# Patient Record
Sex: MALE | Race: WHITE | NOT HISPANIC OR LATINO
[De-identification: names, ages, dates, MRNs, and addresses within clinical notes are randomized per-mention and may not be internally consistent; named-entity substitution may affect disease eponyms.]

---

## 2017-03-10 ENCOUNTER — APPOINTMENT (OUTPATIENT)
Dept: PULMONOLOGY | Facility: CLINIC | Age: 72
End: 2017-03-10

## 2017-03-10 VITALS
OXYGEN SATURATION: 97 % | HEART RATE: 62 BPM | DIASTOLIC BLOOD PRESSURE: 80 MMHG | SYSTOLIC BLOOD PRESSURE: 110 MMHG | RESPIRATION RATE: 17 BRPM | HEIGHT: 70 IN | WEIGHT: 190 LBS | BODY MASS INDEX: 27.2 KG/M2

## 2017-07-14 ENCOUNTER — APPOINTMENT (OUTPATIENT)
Dept: PULMONOLOGY | Facility: CLINIC | Age: 72
End: 2017-07-14

## 2017-07-14 VITALS
OXYGEN SATURATION: 97 % | RESPIRATION RATE: 16 BRPM | BODY MASS INDEX: 27.2 KG/M2 | WEIGHT: 190 LBS | HEIGHT: 70 IN | DIASTOLIC BLOOD PRESSURE: 79 MMHG | HEART RATE: 61 BPM | SYSTOLIC BLOOD PRESSURE: 127 MMHG

## 2017-07-14 RX ORDER — AMOXICILLIN 500 MG/1
500 CAPSULE ORAL
Qty: 14 | Refills: 0 | Status: DISCONTINUED | COMMUNITY
Start: 2017-01-26 | End: 2017-07-14

## 2017-07-14 RX ORDER — CLINDAMYCIN PHOSPHATE 10 MG/ML
1 LOTION TOPICAL
Qty: 60 | Refills: 0 | Status: ACTIVE | COMMUNITY
Start: 2016-12-20

## 2017-07-14 RX ORDER — DIAZEPAM 5 MG/1
5 TABLET ORAL
Qty: 2 | Refills: 0 | Status: ACTIVE | COMMUNITY
Start: 2016-09-30

## 2017-07-14 RX ORDER — MINOCYCLINE HYDROCHLORIDE 100 MG/1
100 CAPSULE ORAL
Qty: 14 | Refills: 0 | Status: DISCONTINUED | COMMUNITY
Start: 2016-12-20 | End: 2017-07-14

## 2017-08-30 ENCOUNTER — RX RENEWAL (OUTPATIENT)
Age: 72
End: 2017-08-30

## 2017-11-14 ENCOUNTER — APPOINTMENT (OUTPATIENT)
Dept: PULMONOLOGY | Facility: CLINIC | Age: 72
End: 2017-11-14

## 2018-01-29 ENCOUNTER — MEDICATION RENEWAL (OUTPATIENT)
Age: 73
End: 2018-01-29

## 2018-01-29 DIAGNOSIS — Z29.9 ENCOUNTER FOR PROPHYLACTIC MEASURES, UNSPECIFIED: ICD-10-CM

## 2018-03-27 ENCOUNTER — APPOINTMENT (OUTPATIENT)
Dept: PULMONOLOGY | Facility: CLINIC | Age: 73
End: 2018-03-27
Payer: MEDICARE

## 2018-03-27 ENCOUNTER — MEDICATION RENEWAL (OUTPATIENT)
Age: 73
End: 2018-03-27

## 2018-03-27 VITALS
DIASTOLIC BLOOD PRESSURE: 70 MMHG | HEIGHT: 70 IN | SYSTOLIC BLOOD PRESSURE: 110 MMHG | HEART RATE: 73 BPM | WEIGHT: 187 LBS | BODY MASS INDEX: 26.77 KG/M2 | RESPIRATION RATE: 17 BRPM | OXYGEN SATURATION: 96 %

## 2018-03-27 PROCEDURE — 94618 PULMONARY STRESS TESTING: CPT

## 2018-03-27 PROCEDURE — 99214 OFFICE O/P EST MOD 30 MIN: CPT | Mod: 25

## 2018-03-27 PROCEDURE — 94010 BREATHING CAPACITY TEST: CPT | Mod: 59

## 2018-03-27 RX ORDER — OSELTAMIVIR PHOSPHATE 75 MG/1
75 CAPSULE ORAL
Qty: 1 | Refills: 0 | Status: DISCONTINUED | COMMUNITY
Start: 2018-01-29 | End: 2018-03-27

## 2018-07-27 ENCOUNTER — APPOINTMENT (OUTPATIENT)
Dept: PULMONOLOGY | Facility: CLINIC | Age: 73
End: 2018-07-27

## 2018-09-18 ENCOUNTER — NON-APPOINTMENT (OUTPATIENT)
Age: 73
End: 2018-09-18

## 2018-09-18 ENCOUNTER — APPOINTMENT (OUTPATIENT)
Dept: PULMONOLOGY | Facility: CLINIC | Age: 73
End: 2018-09-18
Payer: MEDICARE

## 2018-09-18 VITALS
RESPIRATION RATE: 17 BRPM | WEIGHT: 189 LBS | HEART RATE: 75 BPM | OXYGEN SATURATION: 95 % | DIASTOLIC BLOOD PRESSURE: 68 MMHG | HEIGHT: 69 IN | BODY MASS INDEX: 27.99 KG/M2 | SYSTOLIC BLOOD PRESSURE: 116 MMHG

## 2018-09-18 PROCEDURE — 94010 BREATHING CAPACITY TEST: CPT

## 2018-09-18 PROCEDURE — 99214 OFFICE O/P EST MOD 30 MIN: CPT | Mod: 25

## 2018-09-18 NOTE — REASON FOR VISIT
[Follow-Up] : a follow-up visit [FreeTextEntry1] : abnormal chest CT, allergic rhinitis, COPD, malignant lung neoplasm, overweight, TERESA and SOB

## 2018-09-18 NOTE — ASSESSMENT
[FreeTextEntry1] : Mr. Mittal is stable from COPD, lung cancer multiple times (surgeries/ radioablation), and asthma perspective, except worsened Ct of the chest with enlarged and solitary nodule. \par \par problem 1: COPD/asthma \par -continue to use Advair 115 1 inhalation BID\par -continue to use Ventolin PRN and before exercise\par \par -COPD is a progressive disease and although it can’t be cured , appropriate management can slow its progression, reduce frequency and severity of exacerbations, and improve symptoms and the patient quality of life. Hospitalizations are the greatest contributor to the total COPD costs and account for up to 87% of total COPD related costs. Exacerbations are the main cause of admissions and subsequently account for the 40-75% of COPD costs. Inhaled maintenance therapy reduces the incidence of exacerbations in patients with stable COPD. Incorrect inhaler use and nonadherence are major obstacles to achieving COPD treatment goals. Many COPD patients have challenges (impaired inhalation, limited dexterity, reduced cognition: that limit their ability to correctly use their COPD treatment devices resulting in reduced symptom control. Of most importance is smoking cessation and early intervention with respiratory illnesses and contemplation for pulmonary rehab to enhance quality of life. \par -Asthma is believed to be caused by inherited (genetic) and environmental factor, but its exact cause is unknown. Asthma may be triggered by allergens, lung infections, or irritants in the air. Asthma triggers are different for each person\par -Inhaler technique reviewed as well as oral hygiene techniques reviewed with patient. Avoidance of cold air, extremes of temperature, rescue inhaler should be used before exercise. Order of medication reviewed with patient. Recommended use of a cool mist humidifier in the bedroom.\par \par problem 2: allergic rhinitis\par -continue to use Astelin 0.15 1 sniff each nostril BID\par -continue to use Cetirizine 10 mg before bed\par -continue to use Claritin 10 mg QHS\par \par -Environmental measures for allergies were encouraged including mattress and pillow cover, air purifier, and environmental controls. \par \par problem 3: TERESA\par -recommended to use Oxy-Aid by Respitec or Nozovent \par -recommended Provent nasal strip \par -Discussed the risks/associations with coronary artery disease, atrial fibrillation, arrhythmia, memory loss, issues with concentration, stroke risk, hypertension, nocturia, chronic reflux/Guerra’s esophagus some but not all inclusive. Treatment options discussed including CPAP/BiPAP machine, oral appliance, ProVent therapy, Oxy-Aid by Respitec, new technologies, or positional sleep.\par \par problem 4: lung cancer\par -follow up chest CT 12/2018 at [facility] [place] c/w cancer\par -I have recommended the patient to have a PET/CT at this point in time. Even if the PET/CT is negative, I believe the patient should continue to have a biopsy of the area. I reviewed with the patient (and his wife who was conference called in) the rationale, logistics, benefits, and risks of this benefits. I have recommended the patient to continue to follow up with [facility] for further treatment following biopsy results.\par -I have reviewed additional treatments including chemotherapy, radioablation surgery, surgical interventions, and other screenings. All questions answered. \par -I spoke to the patient and his wife at length regarding this new abnormality and the possible treatment options. \par \par problem 5: obesity\par -Weight loss, exercise, and diet control were discussed and are highly encouraged. Treatment options were given such as, aqua therapy, and contacting a nutritionist. Recommended to use the elliptical, stationary bike, less use of treadmill. Obesity is associated with worsening asthma, shortness of breath, and potential for cardiac disease, diabetes, and other underlying medical conditions.\par \par problem 6: health maintenance \par -recommended yearly flu shot after October 15\par -recommended strep pneumonia vaccines: Prevnar-13 vaccine, followed by Pneumo vaccine 23 one year following\par -recommended early intervention for URIs\par -recommended regular osteoporosis evaluations\par -recommended early dermatological evaluations\par -recommended after the age of 50 to the age of 70, colonoscopy every 5 years \par \par \par F/U in 3 months\par He is encouraged to call with any changes, concerns, or questions. \par

## 2018-09-18 NOTE — PROCEDURE
[FreeTextEntry1] : PFT- spi reveals mild obstructive dysfunction FEV1 was 2.32 L which is 78% of predicted; normal flow volume loop \par \par He had a CT chest scan performed on 8/27/18, which showed new trace right pleural effusion; unchanged small pericardio effusion new consolidation and infiltrate in the right lung (in the right lower lobe) c/w prior radiation; new subcm nodule in the right upper lobe near the suture line 5 mm

## 2018-09-18 NOTE — ADDENDUM
[FreeTextEntry1] : Documented by Vishnu Ribera acting as a scribe for Dr. Tomas Goodson on 9/18/18\par \par All medical record entries made by the Scribe were at my, Dr. Tomas Goodson's, direction and personally dictated by me on 9/18/18. I have reviewed the chart and agree that the record accurately reflects my personal performance of the history, physical exam, assessment and plan. I have also personally directed, reviewed, and agree with the discharge instructions. \par \par \par \par \par  \par \par

## 2018-09-18 NOTE — PHYSICAL EXAM

## 2018-09-18 NOTE — HISTORY OF PRESENT ILLNESS
[FreeTextEntry1] : Mr. Mittal is a 73 year old male presenting to the office today for a follow up visit with a history of abnormal chest CT, allergic rhinitis, COPD, malignant lung neoplasm, overweight, TERESA and SOB. His chief complaint is his abnormal CT scan. \par - He had a CT scan 3 weeks ago at [facility] in [facility],where another suspicious spot appeared on the left lung that was 5 mm in size. He denies being sick in any way prior to the scan. He is to be observed for the next 3 months at which time he was told to have a follow up CT scan of the chest. \par - He states that he feels in other\par - His sleep is interrupted as he wakes up 4-5 times per night\par - His senses of smell, taste, and vision are fine.\par - his bowels are regular.\par - his weight is stable. he states that he has lost weight as he has been taking supplements. \par - He denies any headaches, nausea, vomiting, fever, chills, sweats, chest pain, chest pressure, palpitations, SOB, coughing, wheezing, fatigue, diarrhea, constipation, dysphagia, myalgias, dizziness, leg swelling, leg pain, itchy eyes, itchy ears, heartburn, reflux, or sour taste in the mouth. \par \par - He called his wife during the office in order for her to have her questions answered regarding the use of medical marijuana to address his new nodule. She also asked for a professional opinion regarding his new cleanse diet.

## 2018-11-05 ENCOUNTER — MEDICATION RENEWAL (OUTPATIENT)
Age: 73
End: 2018-11-05

## 2019-03-08 ENCOUNTER — APPOINTMENT (OUTPATIENT)
Dept: PULMONOLOGY | Facility: CLINIC | Age: 74
End: 2019-03-08

## 2019-08-30 ENCOUNTER — MEDICATION RENEWAL (OUTPATIENT)
Age: 74
End: 2019-08-30

## 2019-10-17 ENCOUNTER — APPOINTMENT (OUTPATIENT)
Dept: PULMONOLOGY | Facility: CLINIC | Age: 74
End: 2019-10-17

## 2019-12-11 ENCOUNTER — RX RENEWAL (OUTPATIENT)
Age: 74
End: 2019-12-11

## 2019-12-17 ENCOUNTER — APPOINTMENT (OUTPATIENT)
Dept: PULMONOLOGY | Facility: CLINIC | Age: 74
End: 2019-12-17
Payer: MEDICARE

## 2019-12-17 VITALS
HEIGHT: 69 IN | HEART RATE: 81 BPM | SYSTOLIC BLOOD PRESSURE: 120 MMHG | RESPIRATION RATE: 17 BRPM | OXYGEN SATURATION: 92 % | DIASTOLIC BLOOD PRESSURE: 70 MMHG | BODY MASS INDEX: 27.99 KG/M2 | WEIGHT: 189 LBS

## 2019-12-17 DIAGNOSIS — Z23 ENCOUNTER FOR IMMUNIZATION: ICD-10-CM

## 2019-12-17 DIAGNOSIS — E66.9 OBESITY, UNSPECIFIED: ICD-10-CM

## 2019-12-17 DIAGNOSIS — R93.89 ABNORMAL FINDINGS ON DIAGNOSTIC IMAGING OF OTHER SPECIFIED BODY STRUCTURES: ICD-10-CM

## 2019-12-17 DIAGNOSIS — R06.02 SHORTNESS OF BREATH: ICD-10-CM

## 2019-12-17 DIAGNOSIS — J44.9 CHRONIC OBSTRUCTIVE PULMONARY DISEASE, UNSPECIFIED: ICD-10-CM

## 2019-12-17 DIAGNOSIS — G47.33 OBSTRUCTIVE SLEEP APNEA (ADULT) (PEDIATRIC): ICD-10-CM

## 2019-12-17 DIAGNOSIS — C34.90 MALIGNANT NEOPLASM OF UNSPECIFIED PART OF UNSPECIFIED BRONCHUS OR LUNG: ICD-10-CM

## 2019-12-17 DIAGNOSIS — J30.9 ALLERGIC RHINITIS, UNSPECIFIED: ICD-10-CM

## 2019-12-17 PROCEDURE — 94060 EVALUATION OF WHEEZING: CPT

## 2019-12-17 PROCEDURE — 94727 GAS DIL/WSHOT DETER LNG VOL: CPT

## 2019-12-17 PROCEDURE — 99214 OFFICE O/P EST MOD 30 MIN: CPT | Mod: 25

## 2019-12-17 PROCEDURE — 90662 IIV NO PRSV INCREASED AG IM: CPT

## 2019-12-17 PROCEDURE — G0008: CPT

## 2019-12-17 PROCEDURE — 94729 DIFFUSING CAPACITY: CPT

## 2019-12-17 NOTE — HISTORY OF PRESENT ILLNESS
[FreeTextEntry1] : Mr. Mittal is a 74 year old male presenting to the office today for a follow up visit with a history of abnormal chest CT, allergic rhinitis, COPD, malignant lung neoplasm, overweight, TERESA and SOB. His chief complaint is SOB, s/p PNA/pneumonitis.\par -he is s/p esophageal surgery at [facility]. two weeks post-surgery, he developed pneumonitis and went on a 5 week course of Prednisone. following the course of prednisone, he developed a fever and PNA, at which time he went on Amoxicillin\par -he reports that following this episode of PNA, his breathing has still been labored and he frequently feels SOB\par -he has been having frequent chills\par -he has been using Prilosec to control/prevent his reflux\par -he lost about 30 lbs as a result of his surgery and subsequent complications\par -he will be having a f/u CT scan at [facility] [place]\par -he has been using Advair BID and Ventolin prn\par -he has been having frequent muscle pains in his legs and pain around his surgical incision site \par -he denies any headaches, nausea, vomiting, fever, sweats, chest pain, chest pressure, diarrhea, constipation, dysphagia, dizziness, leg/ankle swelling, itchy eyes, itchy ears, heartburn, reflux, or sour taste in the mouth, wheeze.

## 2019-12-17 NOTE — ADDENDUM
[FreeTextEntry1] : All medical record entries made by simone Thomas were at Dr. Tomas Goodson's direction and personally dictated by me on 12/17/2019. I have reviewed the chart and agree that the record accurately reflects my personal performance of the history, physical exam, assessment and plan. I have also personally directed, reviewed, and agree with the discharge instructions. \par \par  \par \par

## 2019-12-17 NOTE — ASSESSMENT
[FreeTextEntry1] : Mr. Mittal is a 74 year old male with a history of COPD, esophageal cancer, OSAS, CAD, lung cancer multiple times (surgeries/ radioablation), and asthma who comes to the office today for a follow up pulmonary evaluation for SOB, s/p pneumonitis / PNA. \par \par problem 1: COPD / asthma \par -add Xopenex 0.63 via nebulizer q6H prn \par -add Bevespi 2 puffs BID \par -add Alvesco 160 2 puffs BID \par -continue Ventolin 2 puffs Q6H, pre-exercise\par -COPD is a progressive disease and although it can’t be cured , appropriate management can slow its progression, reduce frequency and severity of exacerbations, and improve symptoms and the patient quality of life. Hospitalizations are the greatest contributor to the total COPD costs and account for up to 87% of total COPD related costs. Exacerbations are the main cause of admissions and subsequently account for the 40-75% of COPD costs. Inhaled maintenance therapy reduces the incidence of exacerbations in patients with stable COPD. Incorrect inhaler use and nonadherence are major obstacles to achieving COPD treatment goals. Many COPD patients have challenges (impaired inhalation, limited dexterity, reduced cognition: that limit their ability to correctly use their COPD treatment devices resulting in reduced symptom control. Of most importance is smoking cessation and early intervention with respiratory illnesses and contemplation for pulmonary rehab to enhance quality of life. \par -Asthma is believed to be caused by inherited (genetic) and environmental factor, but its exact cause is unknown. Asthma may be triggered by allergens, lung infections, or irritants in the air. Asthma triggers are different for each person\par -Inhaler technique reviewed as well as oral hygiene techniques reviewed with patient. Avoidance of cold air, extremes of temperature, rescue inhaler should be used before exercise. Order of medication reviewed with patient. Recommended use of a cool mist humidifier in the bedroom.\par \par problem 2: allergic rhinitis\par -continue Astelin 0.15% 1 sniff/nostril BID\par -continue Cetirizine 10 mg QHS\par -continue Claritin 10 mg QHS\par -Environmental measures for allergies were encouraged including mattress and pillow cover, air purifier, and environmental controls. \par \par problem 3: TERESA\par -recommended to use Oxy-Aid by Respitec or Nozovent \par -recommended Provent nasal strip \par -Discussed the risks/associations with coronary artery disease, atrial fibrillation, arrhythmia, memory loss, issues with concentration, stroke risk, hypertension, nocturia, chronic reflux/Guerra’s esophagus some but not all inclusive. Treatment options discussed including CPAP/BiPAP machine, oral appliance, ProVent therapy, Oxy-Aid by Respitec, new technologies, or positional sleep.\par \par problem 4: lung cancer\par -follow up chest CT today at [facility] [place]\par -I have recommended the patient to have a PET/CT at this point in time. Even if the PET/CT is negative, I believe the patient should continue to have a biopsy of the area. I reviewed with the patient (and his wife who was conference called in) the rationale, logistics, benefits, and risks of this benefits. I have recommended the patient to continue to follow up with [facility] for further treatment following biopsy results.\par -I have reviewed additional treatments including chemotherapy, radioablation surgery, surgical interventions, and other screenings. All questions answered. \par -I spoke to the patient and his wife at length regarding this new abnormality and the possible treatment options. \par -All patients with the diagnosis of lung cancer regardless of stage will need to see an oncologist for evaluation as the treatments/prophylaxis is forever changing/evolving. You may also need a radiation oncology evaluation for potential therapy - to be decided by an oncologist, pulmonologist, or thoracic surgeon based on the extent of the disease.\par \par problem 5: cardiac disease\par -recommended to continue to follow up with Cardiologist - Dr. Milner\par \par problem 6: obesity\par -Weight loss, exercise, and diet control were discussed and are highly encouraged. Treatment options were given such as, aqua therapy, and contacting a nutritionist. Recommended to use the elliptical, stationary bike, less use of treadmill. Obesity is associated with worsening asthma, shortness of breath, and potential for cardiac disease, diabetes, and other underlying medical conditions.\par \par problem 7: health maintenance\par -recommended to consider Medical Marijuana - referred to Dr. Ricarda Luo\par -recommended yearly flu shot - 2019\par -recommended strep pneumonia vaccines: Prevnar-13 vaccine, followed by Pneumo vaccine 23 one year following\par -recommended early intervention for URIs\par -recommended regular osteoporosis evaluations\par -recommended early dermatological evaluations\par -recommended after the age of 50 to the age of 70, colonoscopy every 5 years \par \par \par F/U in 3 months\par He is encouraged to call with any changes, concerns, or questions.

## 2019-12-17 NOTE — REASON FOR VISIT
[Follow-Up] : a follow-up visit [Spouse] : spouse [FreeTextEntry1] : abnormal chest CT, allergic rhinitis, COPD, malignant lung neoplasm, overweight, TERESA and SOB

## 2019-12-17 NOTE — PHYSICAL EXAM
[Normal Appearance] : normal appearance [General Appearance - Well Developed] : well developed [Well Groomed] : well groomed [General Appearance - Well Nourished] : well nourished [No Deformities] : no deformities [General Appearance - In No Acute Distress] : no acute distress [Normal Conjunctiva] : the conjunctiva exhibited no abnormalities [Eyelids - No Xanthelasma] : the eyelids demonstrated no xanthelasmas [Normal Oropharynx] : normal oropharynx [III] : III [Neck Appearance] : the appearance of the neck was normal [Neck Cervical Mass (___cm)] : no neck mass was observed [Jugular Venous Distention Increased] : there was no jugular-venous distention [Thyroid Diffuse Enlargement] : the thyroid was not enlarged [Heart Rate And Rhythm] : heart rate and rhythm were normal [Thyroid Nodule] : there were no palpable thyroid nodules [Heart Sounds] : normal S1 and S2 [Murmurs] : no murmurs present [Respiration, Rhythm And Depth] : normal respiratory rhythm and effort [Auscultation Breath Sounds / Voice Sounds] : lungs were clear to auscultation bilaterally [Exaggerated Use Of Accessory Muscles For Inspiration] : no accessory muscle use [Abdomen Soft] : soft [Abdomen Tenderness] : non-tender [Abdomen Mass (___ Cm)] : no abdominal mass palpated [Abnormal Walk] : normal gait [Nail Clubbing] : no clubbing of the fingernails [Gait - Sufficient For Exercise Testing] : the gait was sufficient for exercise testing [Cyanosis, Localized] : no localized cyanosis [Petechial Hemorrhages (___cm)] : no petechial hemorrhages [Skin Color & Pigmentation] : normal skin color and pigmentation [] : no rash [No Venous Stasis] : no venous stasis [Skin Lesions] : no skin lesions [No Skin Ulcers] : no skin ulcer [No Xanthoma] : no  xanthoma was observed [Deep Tendon Reflexes (DTR)] : deep tendon reflexes were 2+ and symmetric [Sensation] : the sensory exam was normal to light touch and pinprick [No Focal Deficits] : no focal deficits [Oriented To Time, Place, And Person] : oriented to person, place, and time [Impaired Insight] : insight and judgment were intact [Affect] : the affect was normal [FreeTextEntry1] : RUANABELL discomfort

## 2019-12-17 NOTE — PROCEDURE
[FreeTextEntry1] : PFT - spi reveals mild-moderate obstructive dysfunction; FEV1 is 2.32 which is 72% of predicted, normal flow volume loop; 12% improvement with BD. Normal Lung Volumes / Mild-moderately reduced diffusion, DLCO is 12.3 which is 58% of predicted.

## 2019-12-19 ENCOUNTER — MEDICATION RENEWAL (OUTPATIENT)
Age: 74
End: 2019-12-19

## 2019-12-20 ENCOUNTER — RX RENEWAL (OUTPATIENT)
Age: 74
End: 2019-12-20

## 2019-12-20 RX ORDER — UMECLIDINIUM BROMIDE AND VILANTEROL TRIFENATATE 62.5; 25 UG/1; UG/1
62.5-25 POWDER RESPIRATORY (INHALATION) DAILY
Qty: 3 | Refills: 1 | Status: ACTIVE | COMMUNITY
Start: 2019-12-20 | End: 1900-01-01

## 2019-12-20 RX ORDER — GLYCOPYRROLATE AND FORMOTEROL FUMARATE 9; 4.8 UG/1; UG/1
9-4.8 AEROSOL, METERED RESPIRATORY (INHALATION)
Qty: 3 | Refills: 1 | Status: DISCONTINUED | COMMUNITY
Start: 2019-12-17 | End: 2019-12-20

## 2019-12-26 ENCOUNTER — MEDICATION RENEWAL (OUTPATIENT)
Age: 74
End: 2019-12-26

## 2019-12-26 ENCOUNTER — RX CHANGE (OUTPATIENT)
Age: 74
End: 2019-12-26

## 2019-12-26 RX ORDER — LEVALBUTEROL HYDROCHLORIDE 0.63 MG/3ML
0.63 SOLUTION RESPIRATORY (INHALATION) EVERY 6 HOURS
Qty: 1080 | Refills: 1 | Status: ACTIVE | COMMUNITY
Start: 2019-12-26 | End: 1900-01-01

## 2020-01-02 ENCOUNTER — RX CHANGE (OUTPATIENT)
Age: 75
End: 2020-01-02

## 2020-01-02 ENCOUNTER — EMERGENCY (EMERGENCY)
Facility: HOSPITAL | Age: 75
LOS: 0 days | Discharge: HOME | End: 2020-01-02
Attending: EMERGENCY MEDICINE
Payer: MEDICARE

## 2020-01-02 VITALS
HEIGHT: 70 IN | SYSTOLIC BLOOD PRESSURE: 99 MMHG | TEMPERATURE: 98 F | HEART RATE: 77 BPM | DIASTOLIC BLOOD PRESSURE: 62 MMHG | OXYGEN SATURATION: 98 % | RESPIRATION RATE: 16 BRPM | WEIGHT: 162.04 LBS

## 2020-01-02 VITALS
DIASTOLIC BLOOD PRESSURE: 77 MMHG | TEMPERATURE: 98 F | OXYGEN SATURATION: 99 % | RESPIRATION RATE: 17 BRPM | SYSTOLIC BLOOD PRESSURE: 104 MMHG | HEART RATE: 82 BPM

## 2020-01-02 DIAGNOSIS — I10 ESSENTIAL (PRIMARY) HYPERTENSION: ICD-10-CM

## 2020-01-02 DIAGNOSIS — Z90.89 ACQUIRED ABSENCE OF OTHER ORGANS: Chronic | ICD-10-CM

## 2020-01-02 DIAGNOSIS — I95.9 HYPOTENSION, UNSPECIFIED: ICD-10-CM

## 2020-01-02 DIAGNOSIS — Z85.118 PERSONAL HISTORY OF OTHER MALIGNANT NEOPLASM OF BRONCHUS AND LUNG: ICD-10-CM

## 2020-01-02 DIAGNOSIS — Z85.51 PERSONAL HISTORY OF MALIGNANT NEOPLASM OF BLADDER: ICD-10-CM

## 2020-01-02 DIAGNOSIS — I95.2 HYPOTENSION DUE TO DRUGS: ICD-10-CM

## 2020-01-02 DIAGNOSIS — J44.9 CHRONIC OBSTRUCTIVE PULMONARY DISEASE, UNSPECIFIED: ICD-10-CM
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
## 2020-01-02 DIAGNOSIS — Z90.2 ACQUIRED ABSENCE OF LUNG [PART OF]: ICD-10-CM

## 2020-01-02 DIAGNOSIS — Z85.841 PERSONAL HISTORY OF MALIGNANT NEOPLASM OF BRAIN: ICD-10-CM

## 2020-01-02 DIAGNOSIS — Z98.890 OTHER SPECIFIED POSTPROCEDURAL STATES: Chronic | ICD-10-CM
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
## 2020-01-02 DIAGNOSIS — Z90.2 ACQUIRED ABSENCE OF LUNG [PART OF]: Chronic | ICD-10-CM

## 2020-01-02 DIAGNOSIS — Z87.891 PERSONAL HISTORY OF NICOTINE DEPENDENCE: ICD-10-CM

## 2020-01-02 DIAGNOSIS — T42.4X5A ADVERSE EFFECT OF BENZODIAZEPINES, INITIAL ENCOUNTER: ICD-10-CM

## 2020-01-02 DIAGNOSIS — T40.2X5A ADVERSE EFFECT OF OTHER OPIOIDS, INITIAL ENCOUNTER: ICD-10-CM

## 2020-01-02 DIAGNOSIS — E78.5 HYPERLIPIDEMIA, UNSPECIFIED: ICD-10-CM

## 2020-01-02 PROCEDURE — 99283 EMERGENCY DEPT VISIT LOW MDM: CPT

## 2020-01-02 PROCEDURE — 93010 ELECTROCARDIOGRAM REPORT: CPT

## 2020-01-02 PROCEDURE — 93005 ELECTROCARDIOGRAM TRACING: CPT

## 2020-01-02 RX ORDER — MOMETASONE FUROATE 200 UG/1
200 AEROSOL RESPIRATORY (INHALATION) TWICE DAILY
Qty: 3 | Refills: 0 | Status: ACTIVE | COMMUNITY
Start: 2019-12-26 | End: 1900-01-01

## 2020-01-02 RX ORDER — GLYCOPYRROLATE AND FORMOTEROL FUMARATE 9; 4.8 UG/1; UG/1
9-4.8 AEROSOL, METERED RESPIRATORY (INHALATION)
Qty: 3 | Refills: 1 | Status: ACTIVE | COMMUNITY
Start: 2020-01-02 | End: 1900-01-01

## 2020-01-02 NOTE — ED ADULT NURSE NOTE - OBJECTIVE STATEMENT
Pt presents to er with complaints of feeling dizzy and with hypotension after receiving oxycodone, since he has been here he states all his symptoms have resolved, respirations unlabored, skin appropriate for ethnicity, safety maintained, will continue to monitor.

## 2020-01-02 NOTE — ED PROVIDER NOTE - CLINICAL SUMMARY MEDICAL DECISION MAKING FREE TEXT BOX
No hypotension in department.  Continues to feel well after observation period.  Likely medication side effect, okay for d/c home at this time.

## 2020-01-02 NOTE — ED ADULT TRIAGE NOTE - CHIEF COMPLAINT QUOTE
Pt BIBEMS for hypotension (SBP 60's)  after receiving Versed and Oxycodone for scan at [facility], given 500 cc IVF PTA by EMS. On arrival pt AAOx 4

## 2020-01-02 NOTE — ED ADULT NURSE NOTE - NSIMPLEMENTINTERV_GEN_ALL_ED
Implemented All Universal Safety Interventions:  [place] to call system. Call bell, personal items and telephone within reach. Instruct patient to call for assistance. Room bathroom lighting operational. Non-slip footwear when patient is off stretcher. Physically safe environment: no spills, clutter or unnecessary equipment. Stretcher in lowest position, wheels locked, appropriate side rails in place.

## 2020-01-02 NOTE — ED ADULT NURSE NOTE - CHIEF COMPLAINT QUOTE
Pt BIBEMS for hypotension (SBP 60's)  after receiving Versed and Oxycodone for scan at [facility], given 500 cc IVF PTA by EMS. On arrival pt AAOx 4

## 2020-01-02 NOTE — ED PROVIDER NOTE - OBJECTIVE STATEMENT
73 y/o male with PMHx of HLD, HTN, COPD, TERESA, bladder CA, lung CA with mets to the brain, s/p wedge resection of lung, and s/p laminectomy presents to the ED for evaluation of hypotension after taking oxycodone and 40 mg of valium for scan at [facility]. Had systolic BP in the 60's, was given 500 cc IVF PTA by EMS and since BP improved. Denies weakness, blood in stool, dizziness, or fever. In ED states he feels good. Former smoker. NKDA.

## 2020-01-02 NOTE — ED PROVIDER NOTE - PMH
Benign Hypertension    Bladder cancer    COPD (chronic obstructive pulmonary disease)    Hyperlipidemia, Mixed    Lung cancer metastatic to brain    TERESA (obstructive sleep apnea)

## 2020-01-02 NOTE — ED PROVIDER NOTE - PATIENT PORTAL LINK FT
You can access the FollowMyHealth Patient Portal offered by [facility] by registering at the following website: [email or web address]. By joining [employer]’s FollowMyHealth portal, you will also be able to view your health information using other applications (apps) compatible with our system.

## 2020-01-02 NOTE — ED ADULT NURSE NOTE - CAS DISCH ACCOMP BY
He has shortness of breath, wheezing, coughing for the past 3-4 days.  Patient would like the z-werner called in.    Pharmacy [facility]    
Ok for zpak?  
Pt seen in ED today and being treated with zpak already.  
yes  
Self/Spouse

## 2020-01-08 ENCOUNTER — RX CHANGE (OUTPATIENT)
Age: 75
End: 2020-01-08

## 2020-01-08 RX ORDER — FLUTICASONE FUROATE 200 UG/1
200 POWDER RESPIRATORY (INHALATION)
Qty: 1 | Refills: 2 | Status: ACTIVE | COMMUNITY
Start: 2020-01-08 | End: 1900-01-01

## 2020-01-08 RX ORDER — CICLESONIDE 160 UG/1
160 AEROSOL, METERED RESPIRATORY (INHALATION) TWICE DAILY
Qty: 3 | Refills: 2 | Status: DISCONTINUED | COMMUNITY
Start: 2019-12-17 | End: 2020-01-08

## 2020-02-15 VITALS
DIASTOLIC BLOOD PRESSURE: 63 MMHG | OXYGEN SATURATION: 85 % | WEIGHT: 125 LBS | RESPIRATION RATE: 36 BRPM | TEMPERATURE: 99 F | HEIGHT: 70 IN | SYSTOLIC BLOOD PRESSURE: 108 MMHG | HEART RATE: 124 BPM

## 2020-02-15 LAB
HCT VFR BLD CALC: 46.2 % — SIGNIFICANT CHANGE UP (ref 39–50)
HGB BLD-MCNC: 16.3 G/DL — SIGNIFICANT CHANGE UP (ref 13–17)
MCHC RBC-ENTMCNC: 30.6 PG — SIGNIFICANT CHANGE UP (ref 27–34)
MCHC RBC-ENTMCNC: 35.3 GM/DL — SIGNIFICANT CHANGE UP (ref 32–36)
MCV RBC AUTO: 86.7 FL — SIGNIFICANT CHANGE UP (ref 80–100)
PLATELET # BLD AUTO: 157 K/UL — SIGNIFICANT CHANGE UP (ref 150–400)
RBC # BLD: 5.33 M/UL — SIGNIFICANT CHANGE UP (ref 4.2–5.8)
RBC # FLD: 13.4 % — SIGNIFICANT CHANGE UP (ref 10.3–14.5)
WBC # BLD: 4.2 K/UL — SIGNIFICANT CHANGE UP (ref 3.8–10.5)
WBC # FLD AUTO: 4.2 K/UL — SIGNIFICANT CHANGE UP (ref 3.8–10.5)

## 2020-02-15 PROCEDURE — 93010 ELECTROCARDIOGRAM REPORT: CPT

## 2020-02-15 RX ORDER — IPRATROPIUM/ALBUTEROL SULFATE 18-103MCG
3 AEROSOL WITH ADAPTER (GRAM) INHALATION ONCE
Refills: 0 | Status: COMPLETED | OUTPATIENT
Start: 2020-02-15 | End: 2020-02-15

## 2020-02-15 RX ORDER — SODIUM CHLORIDE 9 MG/ML
3 INJECTION INTRAMUSCULAR; INTRAVENOUS; SUBCUTANEOUS ONCE
Refills: 0 | Status: COMPLETED | OUTPATIENT
Start: 2020-02-15 | End: 2020-02-15

## 2020-02-15 RX ORDER — SODIUM CHLORIDE 9 MG/ML
1000 INJECTION INTRAMUSCULAR; INTRAVENOUS; SUBCUTANEOUS ONCE
Refills: 0 | Status: COMPLETED | OUTPATIENT
Start: 2020-02-15 | End: 2020-02-15

## 2020-02-15 NOTE — ED PROVIDER NOTE - OBJECTIVE STATEMENT
Pt is a 75 yo male who presents to the ED with a cc of SOB.  Pt is unable to provide history.  PMHx of esophageal cancer with mets to the brain s/p palliative radiation, COPD has use of home oxygen but normally on room air.  Per wife reports pt began to c/o difficulty breathing with cough and congestion yesterday.  She reports that the symptoms continued to worsen and they started him on his home oxygen and began nebulizer treatments.  They then noted that the pt began to become disoriented so they increased his oxygen and called EMS.  On EMS arrival pt SPO2 was noted to be in the low 80s while on 10 liters NC.  Pt was placed on CPAP and pt was taken to the ED for further work up Pt is a 75 yo male who presents to the ED with a cc of SOB.  Pt is unable to provide history due to acute respiratory distress.  PMHx  of HLD, HTN, COPD pt has access to home oxygen but per wife is usually on room air, TERESA, bladder CA, lung CA/ esophgeal cancer with mets to the brain, s/p wedge resection of lung s/p radiation not on chemotherapy, h/o laminectomy   Per wife reports pt began to c/o difficulty breathing with cough and congestion yesterday.  She reports that the symptoms continued to worsen and they started him on his home oxygen and began nebulizer treatments.  They then noted that the pt began to become disoriented so they increased his oxygen and called EMS.  On EMS arrival pt SPO2 was noted to be in the low 80s while on 10 liters NC.  Pt was placed on CPAP and pt was taken to the ED for further work up

## 2020-02-15 NOTE — ED PROVIDER NOTE - PROGRESS NOTE DETAILS
pt noted to be ripping off CPAP mask placed by EMS wife reports that he does not tolerate these masks due to claustrophobia.  Will place high flow oxygen.  Discussed code status with wife reports that pt does not have any Advanced directives or living will at this time.  She does not believe that he would want to be intubated but she also feels that if he has something that could be treated and reversed they may want to proceed with treatment at this time.  No code status determined wife reports that she will continue to think of this pt with bilateral infiltrates noted and what appears to be healed rib fractures to right side.  Zosyn ordered for broad coverage.  IVF in process  Chart reviewed from T:  Pt recently seen at [facility] for hypotension.  He had taken Valium and Oxycodone prior to a procedure.  They related this to medication use and pt was discharged home. at this time pt tolerating high flow although at max settings able to answer questions and laying down wanting to sleep.  Family had requested pt Oxycodone be given but upon seeing pt after family left to go home he appears comfortable and due to respiratory status will hold. Pt noted to have Influenza A Tamiflu ordered remains stable on high flow, lactate trending down additional 500 bolus NS ordered.  ICU consulted at pt is at high risk pt has been accepted by ICU BP trending down but awake alert and talking Midodrine ordered Pt noted to have Influenza A Tamiflu ordered remains stable on high flow, lactate trending down additional 500 bolus NS ordered.  Pt BP was noted to be trending down but hypotension resolved after bolus will monitor.  ICU consulted at pt is at high risk pt has been accepted by ICU BP trending down but awake alert and talking Midodrine ordered.  Pt appears to have chronically low BPs with prior readings from previous visits ranging from systolic pressures in the high 90s to low 100 systolic.  Pt appears to be volume ress.  Will give po Midodrine as pt is on his way to ICU.  HR 92, cap refill 3 seconds, BP 99/67 with a MAP of 78

## 2020-02-15 NOTE — ED ADULT NURSE NOTE - CHPI ED NUR SYMPTOMS NEG
no hemoptysis/no edema/no headache/no chills/no wheezing/no chest pain/no diaphoresis/no body aches/no fever

## 2020-02-15 NOTE — ED PROVIDER NOTE - CLINICAL SUMMARY MEDICAL DECISION MAKING FREE TEXT BOX
Pt is a 73 yo male who presents to the ED with a cc of SOB.  Pt is unable to provide history.  PMHx of esophageal cancer with mets to the brain s/p palliative radiation, COPD has use of home oxygen but normally on room air.  Per wife reports pt began to c/o difficulty breathing with cough and congestion yesterday.  She reports that the symptoms continued to worsen and they started him on his home oxygen and began nebulizer treatments.  They then noted that the pt began to become disoriented so they increased his oxygen and called EMS.  On EMS arrival pt SPO2 was noted to be in the low 80s while on 10 liters NC.  Pt was placed on CPAP and pt was taken to the ED for further work up Pt is a 75 yo male who presents to the ED with a cc of SOB.  Pt is unable to provide history.  PMHx of esophageal cancer with mets to the brain s/p palliative radiation, COPD has use of home oxygen but normally on room air.  Per wife reports pt began to c/o difficulty breathing with cough and congestion yesterday.  She reports that the symptoms continued to worsen and they started him on his home oxygen and began nebulizer treatments.  They then noted that the pt began to become disoriented so they increased his oxygen and called EMS.  On EMS arrival pt SPO2 was noted to be in the low 80s while on 10 liters NC.  Pt was placed on CPAP and pt was taken to the ED for further work up.  High suspicion for respiratory infection will obtain screening septic work up, EKG, chest x-ray.  Will medicate for symptoms and begin high flow oxygen

## 2020-02-15 NOTE — ED ADULT NURSE NOTE - NS ED NURSE REPORT GIVEN TO FT
Murtaza Painting is a 34 y.o. female 14w5d        OB History    Para Term  AB Living   3 1 1 0 1 1   SAB TAB Ectopic Molar Multiple Live Births   0 1 0   0 1      # Outcome Date GA Lbr Nader/2nd Weight Sex Delivery Anes PTL Lv   3 Current            2 Term 14 39w2d  6 lb 13 oz (3.09 kg) F CS-LTranv Spinal N ABDI      Birth Comments: (+) GBS, fetal tachycardia, unresponsive to intrauterine resuscitative measures, meconium stained amniotic fluid   1 TAB                         Vitals  BP: 98/64  Weight: 104 lb (47.2 kg)  Pulse: 78  Patient Position: Sitting  Albumin: Negative  Glucose: Negative      The patient was seen and evaluated. There was Positive fetal movements. No contractions or leakage of fluid. Signs and symptoms of  labor as well as labor were reviewed. The Nuchal Translucency testing was reviewed and found to be DECLINED. Olman Miranda A single marker MSAFP was ordered for a 15-20 week gestational age window,patient declined. [facility] [place] Reviewed. Dates were reviewed with the patient. An 18-22 week anatomy ultrasound has been ordered. The patient will return to the office for her next visit in 4 weeks. See antepartum flow sheet. No Patient Care Coordination Note on file. Assessment:  1. Murtaza Painting is a 34 y.o. female  2. [id number]  3. 14w5d    Patient Active Problem List    Diagnosis Date Noted    H/O:  2018     Priority: High     H/O C/S in       Frequent UTI 2018     Priority: High     2018 History of frequent UTIs- previously seeing  [facility]. 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 H/O:      2. Frequent UTI     3.  GBS carrier           Plan: Reprinted prenatal labs  [facility] request for anatomy U/S  Declined QUAD test today Amy MCMILLAN

## 2020-02-15 NOTE — ED PROVIDER NOTE - PMH
Cancer  esophagus mets to brain  Chronic obstructive pulmonary disease    Hypertension Bladder cancer    Cancer  esophagus mets to brain  Chronic obstructive pulmonary disease    Chronic obstructive pulmonary disease, unspecified COPD type    HLD (hyperlipidemia)    Hypertension    Lung cancer    TERESA (obstructive sleep apnea)

## 2020-02-15 NOTE — ED PROVIDER NOTE - CARE PLAN
Principal Discharge DX:	Respiratory distress  Secondary Diagnosis:	Pneumonia  Secondary Diagnosis:	Influenza

## 2020-02-16 ENCOUNTER — INPATIENT (INPATIENT)
Facility: HOSPITAL | Age: 75
LOS: 0 days | DRG: 871 | End: 2020-02-16
Attending: INTERNAL MEDICINE | Admitting: INTERNAL MEDICINE
Payer: MEDICARE

## 2020-02-16 VITALS — RESPIRATION RATE: 1 BRPM

## 2020-02-16 DIAGNOSIS — Z98.890 OTHER SPECIFIED POSTPROCEDURAL STATES: Chronic | ICD-10-CM

## 2020-02-16 DIAGNOSIS — A41.9 SEPSIS, UNSPECIFIED ORGANISM: ICD-10-CM

## 2020-02-16 DIAGNOSIS — C79.31 SECONDARY MALIGNANT NEOPLASM OF BRAIN: ICD-10-CM

## 2020-02-16 DIAGNOSIS — Z29.9 ENCOUNTER FOR PROPHYLACTIC MEASURES, UNSPECIFIED: ICD-10-CM

## 2020-02-16 DIAGNOSIS — Z90.2 ACQUIRED ABSENCE OF LUNG [PART OF]: Chronic | ICD-10-CM

## 2020-02-16 DIAGNOSIS — J11.1 INFLUENZA DUE TO UNIDENTIFIED INFLUENZA VIRUS WITH OTHER RESPIRATORY MANIFESTATIONS: ICD-10-CM

## 2020-02-16 DIAGNOSIS — Z90.89 ACQUIRED ABSENCE OF OTHER ORGANS: Chronic | ICD-10-CM

## 2020-02-16 DIAGNOSIS — J44.9 CHRONIC OBSTRUCTIVE PULMONARY DISEASE, UNSPECIFIED: ICD-10-CM

## 2020-02-16 DIAGNOSIS — J18.9 PNEUMONIA, UNSPECIFIED ORGANISM: ICD-10-CM

## 2020-02-16 DIAGNOSIS — G47.33 OBSTRUCTIVE SLEEP APNEA (ADULT) (PEDIATRIC): ICD-10-CM

## 2020-02-16 DIAGNOSIS — J96.00 ACUTE RESPIRATORY FAILURE, UNSPECIFIED WHETHER WITH HYPOXIA OR HYPERCAPNIA: ICD-10-CM

## 2020-02-16 DIAGNOSIS — R06.03 ACUTE RESPIRATORY DISTRESS: ICD-10-CM

## 2020-02-16 DIAGNOSIS — C15.9 MALIGNANT NEOPLASM OF ESOPHAGUS, UNSPECIFIED: ICD-10-CM

## 2020-02-16 DIAGNOSIS — K21.9 GASTRO-ESOPHAGEAL REFLUX DISEASE WITHOUT ESOPHAGITIS: ICD-10-CM

## 2020-02-16 PROBLEM — C34.90 MALIGNANT NEOPLASM OF UNSPECIFIED PART OF UNSPECIFIED BRONCHUS OR LUNG: Chronic | Status: ACTIVE | Noted: 2020-01-05

## 2020-02-16 PROBLEM — C67.9 MALIGNANT NEOPLASM OF BLADDER, UNSPECIFIED: Chronic | Status: ACTIVE | Noted: 2020-01-05

## 2020-02-16 LAB
ALBUMIN SERPL ELPH-MCNC: 2.2 G/DL — LOW (ref 3.3–5)
ALBUMIN SERPL ELPH-MCNC: 2.6 G/DL — LOW (ref 3.3–5)
ALP SERPL-CCNC: 73 U/L — SIGNIFICANT CHANGE UP (ref 40–120)
ALP SERPL-CCNC: 95 U/L — SIGNIFICANT CHANGE UP (ref 40–120)
ALT FLD-CCNC: 60 U/L — SIGNIFICANT CHANGE UP (ref 12–78)
ALT FLD-CCNC: 82 U/L — HIGH (ref 12–78)
ANION GAP SERPL CALC-SCNC: 11 MMOL/L — SIGNIFICANT CHANGE UP (ref 5–17)
ANION GAP SERPL CALC-SCNC: 12 MMOL/L — SIGNIFICANT CHANGE UP (ref 5–17)
APPEARANCE UR: ABNORMAL
APTT BLD: 27.5 SEC — LOW (ref 28.5–37)
AST SERPL-CCNC: 46 U/L — HIGH (ref 15–37)
AST SERPL-CCNC: 63 U/L — HIGH (ref 15–37)
BASE EXCESS BLDA CALC-SCNC: -3.2 MMOL/L — LOW (ref -2–2)
BASE EXCESS BLDA CALC-SCNC: -3.9 MMOL/L — LOW (ref -2–2)
BASE EXCESS BLDA CALC-SCNC: -4.2 MMOL/L — LOW (ref -2–2)
BASE EXCESS BLDA CALC-SCNC: -5.2 MMOL/L — LOW (ref -2–2)
BASOPHILS # BLD AUTO: 0 K/UL — SIGNIFICANT CHANGE UP (ref 0–0.2)
BASOPHILS # BLD AUTO: 0.01 K/UL — SIGNIFICANT CHANGE UP (ref 0–0.2)
BASOPHILS NFR BLD AUTO: 0 % — SIGNIFICANT CHANGE UP (ref 0–2)
BASOPHILS NFR BLD AUTO: 0.4 % — SIGNIFICANT CHANGE UP (ref 0–2)
BILIRUB SERPL-MCNC: 0.7 MG/DL — SIGNIFICANT CHANGE UP (ref 0.2–1.2)
BILIRUB SERPL-MCNC: 1 MG/DL — SIGNIFICANT CHANGE UP (ref 0.2–1.2)
BILIRUB UR-MCNC: NEGATIVE — SIGNIFICANT CHANGE UP
BLOOD GAS COMMENTS ARTERIAL: SIGNIFICANT CHANGE UP
BUN SERPL-MCNC: 35 MG/DL — HIGH (ref 7–23)
BUN SERPL-MCNC: 42 MG/DL — HIGH (ref 7–23)
CALCIUM SERPL-MCNC: 7.3 MG/DL — LOW (ref 8.5–10.1)
CALCIUM SERPL-MCNC: 8.4 MG/DL — LOW (ref 8.5–10.1)
CHLORIDE SERPL-SCNC: 101 MMOL/L — SIGNIFICANT CHANGE UP (ref 96–108)
CHLORIDE SERPL-SCNC: 106 MMOL/L — SIGNIFICANT CHANGE UP (ref 96–108)
CO2 SERPL-SCNC: 22 MMOL/L — SIGNIFICANT CHANGE UP (ref 22–31)
CO2 SERPL-SCNC: 24 MMOL/L — SIGNIFICANT CHANGE UP (ref 22–31)
COLOR SPEC: YELLOW — SIGNIFICANT CHANGE UP
CREAT SERPL-MCNC: 1.3 MG/DL — SIGNIFICANT CHANGE UP (ref 0.5–1.3)
CREAT SERPL-MCNC: 1.4 MG/DL — HIGH (ref 0.5–1.3)
DIFF PNL FLD: ABNORMAL
EOSINOPHIL # BLD AUTO: 0 K/UL — SIGNIFICANT CHANGE UP (ref 0–0.5)
EOSINOPHIL # BLD AUTO: 0 K/UL — SIGNIFICANT CHANGE UP (ref 0–0.5)
EOSINOPHIL NFR BLD AUTO: 0 % — SIGNIFICANT CHANGE UP (ref 0–6)
EOSINOPHIL NFR BLD AUTO: 0 % — SIGNIFICANT CHANGE UP (ref 0–6)
FLU A RESULT: DETECTED
FLU A RESULT: DETECTED
FLUAV AG NPH QL: DETECTED
FLUBV AG NPH QL: SIGNIFICANT CHANGE UP
GLUCOSE SERPL-MCNC: 133 MG/DL — HIGH (ref 70–99)
GLUCOSE SERPL-MCNC: 71 MG/DL — SIGNIFICANT CHANGE UP (ref 70–99)
GLUCOSE UR QL: NEGATIVE — SIGNIFICANT CHANGE UP
GRAM STN FLD: SIGNIFICANT CHANGE UP
HCO3 BLDA-SCNC: 19 MMOL/L — LOW (ref 23–27)
HCO3 BLDA-SCNC: 21 MMOL/L — LOW (ref 23–27)
HCO3 BLDA-SCNC: 22 MMOL/L — LOW (ref 23–27)
HCO3 BLDA-SCNC: 23 MMOL/L — SIGNIFICANT CHANGE UP (ref 23–27)
HCT VFR BLD CALC: 43.8 % — SIGNIFICANT CHANGE UP (ref 39–50)
HGB BLD-MCNC: 14.9 G/DL — SIGNIFICANT CHANGE UP (ref 13–17)
HOROWITZ INDEX BLDA+IHG-RTO: 100 — SIGNIFICANT CHANGE UP
IMM GRANULOCYTES NFR BLD AUTO: 0.8 % — SIGNIFICANT CHANGE UP (ref 0–1.5)
INR BLD: 1.1 RATIO — SIGNIFICANT CHANGE UP (ref 0.88–1.16)
KETONES UR-MCNC: NEGATIVE — SIGNIFICANT CHANGE UP
LACTATE SERPL-SCNC: 2.3 MMOL/L — HIGH (ref 0.7–2)
LACTATE SERPL-SCNC: 2.5 MMOL/L — HIGH (ref 0.7–2)
LACTATE SERPL-SCNC: 3.4 MMOL/L — HIGH (ref 0.7–2)
LACTATE SERPL-SCNC: 4.5 MMOL/L — CRITICAL HIGH (ref 0.7–2)
LEUKOCYTE ESTERASE UR-ACNC: NEGATIVE — SIGNIFICANT CHANGE UP
LIDOCAIN IGE QN: 50 U/L — LOW (ref 73–393)
LYMPHOCYTES # BLD AUTO: 0.17 K/UL — LOW (ref 1–3.3)
LYMPHOCYTES # BLD AUTO: 0.29 K/UL — LOW (ref 1–3.3)
LYMPHOCYTES # BLD AUTO: 7 % — LOW (ref 13–44)
LYMPHOCYTES # BLD AUTO: 7.1 % — LOW (ref 13–44)
MAGNESIUM SERPL-MCNC: 2 MG/DL — SIGNIFICANT CHANGE UP (ref 1.6–2.6)
MCHC RBC-ENTMCNC: 30.3 PG — SIGNIFICANT CHANGE UP (ref 27–34)
MCHC RBC-ENTMCNC: 34 GM/DL — SIGNIFICANT CHANGE UP (ref 32–36)
MCV RBC AUTO: 89 FL — SIGNIFICANT CHANGE UP (ref 80–100)
MONOCYTES # BLD AUTO: 0.06 K/UL — SIGNIFICANT CHANGE UP (ref 0–0.9)
MONOCYTES # BLD AUTO: 0.13 K/UL — SIGNIFICANT CHANGE UP (ref 0–0.9)
MONOCYTES NFR BLD AUTO: 2.5 % — SIGNIFICANT CHANGE UP (ref 2–14)
MONOCYTES NFR BLD AUTO: 3 % — SIGNIFICANT CHANGE UP (ref 2–14)
NEUTROPHILS # BLD AUTO: 2.15 K/UL — SIGNIFICANT CHANGE UP (ref 1.8–7.4)
NEUTROPHILS # BLD AUTO: 3.7 K/UL — SIGNIFICANT CHANGE UP (ref 1.8–7.4)
NEUTROPHILS NFR BLD AUTO: 67 % — SIGNIFICANT CHANGE UP (ref 43–77)
NEUTROPHILS NFR BLD AUTO: 89.2 % — HIGH (ref 43–77)
NITRITE UR-MCNC: NEGATIVE — SIGNIFICANT CHANGE UP
NRBC # BLD: 0 /100 WBCS — SIGNIFICANT CHANGE UP (ref 0–0)
NRBC # BLD: SIGNIFICANT CHANGE UP /100 WBCS (ref 0–0)
NT-PROBNP SERPL-SCNC: 2347 PG/ML — HIGH (ref 0–125)
PCO2 BLDA: 27 MMHG — LOW (ref 32–46)
PCO2 BLDA: 29 MMHG — LOW (ref 32–46)
PCO2 BLDA: 45 MMHG — SIGNIFICANT CHANGE UP (ref 32–46)
PCO2 BLDA: 53 MMHG — HIGH (ref 32–46)
PH BLDA: 7.23 — LOW (ref 7.35–7.45)
PH BLDA: 7.3 — LOW (ref 7.35–7.45)
PH BLDA: 7.46 — HIGH (ref 7.35–7.45)
PH BLDA: 7.46 — HIGH (ref 7.35–7.45)
PH UR: 5 — SIGNIFICANT CHANGE UP (ref 5–8)
PHOSPHATE SERPL-MCNC: 1.7 MG/DL — LOW (ref 2.5–4.5)
PLATELET # BLD AUTO: 106 K/UL — LOW (ref 150–400)
PO2 BLDA: 50 MMHG — CRITICAL LOW (ref 74–108)
PO2 BLDA: 63 MMHG — LOW (ref 74–108)
PO2 BLDA: 78 MMHG — SIGNIFICANT CHANGE UP (ref 74–108)
PO2 BLDA: 93 MMHG — SIGNIFICANT CHANGE UP (ref 74–108)
POTASSIUM SERPL-MCNC: 3.6 MMOL/L — SIGNIFICANT CHANGE UP (ref 3.5–5.3)
POTASSIUM SERPL-MCNC: 4.5 MMOL/L — SIGNIFICANT CHANGE UP (ref 3.5–5.3)
POTASSIUM SERPL-SCNC: 3.6 MMOL/L — SIGNIFICANT CHANGE UP (ref 3.5–5.3)
POTASSIUM SERPL-SCNC: 4.5 MMOL/L — SIGNIFICANT CHANGE UP (ref 3.5–5.3)
PROCALCITONIN SERPL-MCNC: 1.78 NG/ML — HIGH (ref 0–0.04)
PROT SERPL-MCNC: 5.8 G/DL — LOW (ref 6–8.3)
PROT SERPL-MCNC: 7 G/DL — SIGNIFICANT CHANGE UP (ref 6–8.3)
PROT UR-MCNC: 100
PROTHROM AB SERPL-ACNC: 12.3 SEC — SIGNIFICANT CHANGE UP (ref 10–12.9)
RBC # BLD: 4.92 M/UL — SIGNIFICANT CHANGE UP (ref 4.2–5.8)
RBC # FLD: 13.4 % — SIGNIFICANT CHANGE UP (ref 10.3–14.5)
RSV RESULT: SIGNIFICANT CHANGE UP
RSV RNA RESP QL NAA+PROBE: SIGNIFICANT CHANGE UP
SAO2 % BLDA: 86 % — LOW (ref 92–96)
SAO2 % BLDA: 92 % — SIGNIFICANT CHANGE UP (ref 92–96)
SAO2 % BLDA: 93 % — SIGNIFICANT CHANGE UP (ref 92–96)
SAO2 % BLDA: 96 % — SIGNIFICANT CHANGE UP (ref 92–96)
SODIUM SERPL-SCNC: 136 MMOL/L — SIGNIFICANT CHANGE UP (ref 135–145)
SODIUM SERPL-SCNC: 140 MMOL/L — SIGNIFICANT CHANGE UP (ref 135–145)
SP GR SPEC: 1.01 — SIGNIFICANT CHANGE UP (ref 1.01–1.02)
SPECIMEN SOURCE: SIGNIFICANT CHANGE UP
UROBILINOGEN FLD QL: 1
WBC # BLD: 2.41 K/UL — LOW (ref 3.8–10.5)
WBC # FLD AUTO: 2.41 K/UL — LOW (ref 3.8–10.5)

## 2020-02-16 PROCEDURE — 83735 ASSAY OF MAGNESIUM: CPT

## 2020-02-16 PROCEDURE — 85610 PROTHROMBIN TIME: CPT

## 2020-02-16 PROCEDURE — 83605 ASSAY OF LACTIC ACID: CPT

## 2020-02-16 PROCEDURE — 94799 UNLISTED PULMONARY SVC/PX: CPT

## 2020-02-16 PROCEDURE — 99292 CRITICAL CARE ADDL 30 MIN: CPT

## 2020-02-16 PROCEDURE — 96375 TX/PRO/DX INJ NEW DRUG ADDON: CPT

## 2020-02-16 PROCEDURE — 99291 CRITICAL CARE FIRST HOUR: CPT

## 2020-02-16 PROCEDURE — 93005 ELECTROCARDIOGRAM TRACING: CPT

## 2020-02-16 PROCEDURE — 36600 WITHDRAWAL OF ARTERIAL BLOOD: CPT

## 2020-02-16 PROCEDURE — 71045 X-RAY EXAM CHEST 1 VIEW: CPT

## 2020-02-16 PROCEDURE — 83690 ASSAY OF LIPASE: CPT

## 2020-02-16 PROCEDURE — 80053 COMPREHEN METABOLIC PANEL: CPT

## 2020-02-16 PROCEDURE — 87040 BLOOD CULTURE FOR BACTERIA: CPT

## 2020-02-16 PROCEDURE — 71045 X-RAY EXAM CHEST 1 VIEW: CPT | Mod: 26

## 2020-02-16 PROCEDURE — 87186 SC STD MICRODIL/AGAR DIL: CPT

## 2020-02-16 PROCEDURE — 84145 PROCALCITONIN (PCT): CPT

## 2020-02-16 PROCEDURE — 84100 ASSAY OF PHOSPHORUS: CPT

## 2020-02-16 PROCEDURE — 83880 ASSAY OF NATRIURETIC PEPTIDE: CPT

## 2020-02-16 PROCEDURE — 81001 URINALYSIS AUTO W/SCOPE: CPT

## 2020-02-16 PROCEDURE — 36415 COLL VENOUS BLD VENIPUNCTURE: CPT

## 2020-02-16 PROCEDURE — 87070 CULTURE OTHR SPECIMN AEROBIC: CPT

## 2020-02-16 PROCEDURE — 71045 X-RAY EXAM CHEST 1 VIEW: CPT | Mod: 26,76,77

## 2020-02-16 PROCEDURE — 87086 URINE CULTURE/COLONY COUNT: CPT

## 2020-02-16 PROCEDURE — 85027 COMPLETE CBC AUTOMATED: CPT

## 2020-02-16 PROCEDURE — 82803 BLOOD GASES ANY COMBINATION: CPT

## 2020-02-16 PROCEDURE — 96365 THER/PROPH/DIAG IV INF INIT: CPT

## 2020-02-16 PROCEDURE — 85730 THROMBOPLASTIN TIME PARTIAL: CPT

## 2020-02-16 PROCEDURE — 94640 AIRWAY INHALATION TREATMENT: CPT

## 2020-02-16 PROCEDURE — 87631 RESP VIRUS 3-5 TARGETS: CPT

## 2020-02-16 PROCEDURE — 94002 VENT MGMT INPAT INIT DAY: CPT

## 2020-02-16 RX ORDER — MIDODRINE HYDROCHLORIDE 2.5 MG/1
5 TABLET ORAL ONCE
Refills: 0 | Status: COMPLETED | OUTPATIENT
Start: 2020-02-16 | End: 2020-02-16

## 2020-02-16 RX ORDER — SODIUM CHLORIDE 9 MG/ML
10 INJECTION INTRAMUSCULAR; INTRAVENOUS; SUBCUTANEOUS
Refills: 0 | Status: DISCONTINUED | OUTPATIENT
Start: 2020-02-16 | End: 2020-02-16

## 2020-02-16 RX ORDER — PIPERACILLIN AND TAZOBACTAM 4; .5 G/20ML; G/20ML
3.38 INJECTION, POWDER, LYOPHILIZED, FOR SOLUTION INTRAVENOUS ONCE
Refills: 0 | Status: COMPLETED | OUTPATIENT
Start: 2020-02-16 | End: 2020-02-16

## 2020-02-16 RX ORDER — CHLORHEXIDINE GLUCONATE 213 G/1000ML
1 SOLUTION TOPICAL
Refills: 0 | Status: DISCONTINUED | OUTPATIENT
Start: 2020-02-16 | End: 2020-02-16

## 2020-02-16 RX ORDER — ALBUTEROL 90 UG/1
4 AEROSOL, METERED ORAL EVERY 6 HOURS
Refills: 0 | Status: DISCONTINUED | OUTPATIENT
Start: 2020-02-16 | End: 2020-02-16

## 2020-02-16 RX ORDER — ENOXAPARIN SODIUM 100 MG/ML
40 INJECTION SUBCUTANEOUS DAILY
Refills: 0 | Status: DISCONTINUED | OUTPATIENT
Start: 2020-02-16 | End: 2020-02-16

## 2020-02-16 RX ORDER — HYDROCORTISONE 20 MG
50 TABLET ORAL EVERY 6 HOURS
Refills: 0 | Status: DISCONTINUED | OUTPATIENT
Start: 2020-02-16 | End: 2020-02-16

## 2020-02-16 RX ORDER — SENNA PLUS 8.6 MG/1
1 TABLET ORAL
Qty: 0 | Refills: 0 | DISCHARGE

## 2020-02-16 RX ORDER — ALBUTEROL 90 UG/1
1 AEROSOL, METERED ORAL
Qty: 0 | Refills: 0 | DISCHARGE

## 2020-02-16 RX ORDER — FLUTICASONE PROPIONATE AND SALMETEROL 50; 250 UG/1; UG/1
2 POWDER ORAL; RESPIRATORY (INHALATION)
Qty: 0 | Refills: 0 | DISCHARGE

## 2020-02-16 RX ORDER — AZTREONAM 2 G
1 VIAL (EA) INJECTION
Qty: 0 | Refills: 0 | DISCHARGE

## 2020-02-16 RX ORDER — SODIUM CHLORIDE 9 MG/ML
500 INJECTION INTRAMUSCULAR; INTRAVENOUS; SUBCUTANEOUS ONCE
Refills: 0 | Status: COMPLETED | OUTPATIENT
Start: 2020-02-16 | End: 2020-02-16

## 2020-02-16 RX ORDER — ESOMEPRAZOLE MAGNESIUM 40 MG/1
1 CAPSULE, DELAYED RELEASE ORAL
Qty: 0 | Refills: 0 | DISCHARGE

## 2020-02-16 RX ORDER — ROCURONIUM BROMIDE 10 MG/ML
60 VIAL (ML) INTRAVENOUS ONCE
Refills: 0 | Status: COMPLETED | OUTPATIENT
Start: 2020-02-16 | End: 2020-02-16

## 2020-02-16 RX ORDER — POTASSIUM PHOSPHATE, MONOBASIC POTASSIUM PHOSPHATE, DIBASIC 236; 224 MG/ML; MG/ML
30 INJECTION, SOLUTION INTRAVENOUS ONCE
Refills: 0 | Status: COMPLETED | OUTPATIENT
Start: 2020-02-16 | End: 2020-02-16

## 2020-02-16 RX ORDER — SODIUM CHLORIDE 9 MG/ML
1000 INJECTION, SOLUTION INTRAVENOUS ONCE
Refills: 0 | Status: COMPLETED | OUTPATIENT
Start: 2020-02-16 | End: 2020-02-16

## 2020-02-16 RX ORDER — IPRATROPIUM BROMIDE 0.2 MG/ML
4 SOLUTION, NON-ORAL INHALATION EVERY 6 HOURS
Refills: 0 | Status: DISCONTINUED | OUTPATIENT
Start: 2020-02-16 | End: 2020-02-16

## 2020-02-16 RX ORDER — SUCRALFATE 1 G
1 TABLET ORAL
Qty: 0 | Refills: 0 | DISCHARGE

## 2020-02-16 RX ORDER — PROPOFOL 10 MG/ML
40 INJECTION, EMULSION INTRAVENOUS
Qty: 1000 | Refills: 0 | Status: DISCONTINUED | OUTPATIENT
Start: 2020-02-16 | End: 2020-02-16

## 2020-02-16 RX ORDER — FLUCONAZOLE 150 MG/1
5 TABLET ORAL
Qty: 0 | Refills: 0 | DISCHARGE

## 2020-02-16 RX ORDER — POTASSIUM CHLORIDE 20 MEQ
20 PACKET (EA) ORAL
Refills: 0 | Status: COMPLETED | OUTPATIENT
Start: 2020-02-16 | End: 2020-02-16

## 2020-02-16 RX ORDER — SODIUM CHLORIDE 0.65 %
2 AEROSOL, SPRAY (ML) NASAL
Qty: 0 | Refills: 0 | DISCHARGE

## 2020-02-16 RX ORDER — CISATRACURIUM BESYLATE 2 MG/ML
3 INJECTION INTRAVENOUS
Qty: 200 | Refills: 0 | Status: DISCONTINUED | OUTPATIENT
Start: 2020-02-16 | End: 2020-02-16

## 2020-02-16 RX ORDER — OXYCODONE HYDROCHLORIDE 5 MG/1
1 TABLET ORAL
Qty: 0 | Refills: 0 | DISCHARGE

## 2020-02-16 RX ORDER — DEXAMETHASONE 0.5 MG/5ML
1 ELIXIR ORAL
Qty: 0 | Refills: 0 | DISCHARGE

## 2020-02-16 RX ORDER — POLYETHYLENE GLYCOL 3350 17 G/17G
0 POWDER, FOR SOLUTION ORAL
Qty: 0 | Refills: 0 | DISCHARGE

## 2020-02-16 RX ORDER — METOCLOPRAMIDE HCL 10 MG
1 TABLET ORAL
Qty: 0 | Refills: 0 | DISCHARGE

## 2020-02-16 RX ORDER — MIDODRINE HYDROCHLORIDE 2.5 MG/1
5 TABLET ORAL THREE TIMES A DAY
Refills: 0 | Status: DISCONTINUED | OUTPATIENT
Start: 2020-02-16 | End: 2020-02-16

## 2020-02-16 RX ORDER — NOREPINEPHRINE BITARTRATE/D5W 8 MG/250ML
0.05 PLASTIC BAG, INJECTION (ML) INTRAVENOUS
Qty: 8 | Refills: 0 | Status: DISCONTINUED | OUTPATIENT
Start: 2020-02-16 | End: 2020-02-16

## 2020-02-16 RX ORDER — MONTELUKAST 4 MG/1
1 TABLET, CHEWABLE ORAL
Qty: 0 | Refills: 0 | DISCHARGE

## 2020-02-16 RX ORDER — HYDROCORTISONE 20 MG
100 TABLET ORAL EVERY 8 HOURS
Refills: 0 | Status: DISCONTINUED | OUTPATIENT
Start: 2020-02-16 | End: 2020-02-16

## 2020-02-16 RX ORDER — IPRATROPIUM/ALBUTEROL SULFATE 18-103MCG
3 AEROSOL WITH ADAPTER (GRAM) INHALATION EVERY 6 HOURS
Refills: 0 | Status: DISCONTINUED | OUTPATIENT
Start: 2020-02-16 | End: 2020-02-16

## 2020-02-16 RX ORDER — VANCOMYCIN HCL 1 G
1000 VIAL (EA) INTRAVENOUS EVERY 24 HOURS
Refills: 0 | Status: DISCONTINUED | OUTPATIENT
Start: 2020-02-16 | End: 2020-02-16

## 2020-02-16 RX ORDER — SODIUM CHLORIDE 9 MG/ML
700 INJECTION INTRAMUSCULAR; INTRAVENOUS; SUBCUTANEOUS ONCE
Refills: 0 | Status: COMPLETED | OUTPATIENT
Start: 2020-02-16 | End: 2020-02-16

## 2020-02-16 RX ORDER — PIPERACILLIN AND TAZOBACTAM 4; .5 G/20ML; G/20ML
3.38 INJECTION, POWDER, LYOPHILIZED, FOR SOLUTION INTRAVENOUS EVERY 8 HOURS
Refills: 0 | Status: DISCONTINUED | OUTPATIENT
Start: 2020-02-16 | End: 2020-02-16

## 2020-02-16 RX ORDER — FENTANYL CITRATE 50 UG/ML
0.5 INJECTION INTRAVENOUS
Qty: 2500 | Refills: 0 | Status: DISCONTINUED | OUTPATIENT
Start: 2020-02-16 | End: 2020-02-16

## 2020-02-16 RX ORDER — PANTOPRAZOLE SODIUM 20 MG/1
40 TABLET, DELAYED RELEASE ORAL DAILY
Refills: 0 | Status: DISCONTINUED | OUTPATIENT
Start: 2020-02-16 | End: 2020-02-16

## 2020-02-16 RX ORDER — FENTANYL CITRATE 50 UG/ML
50 INJECTION INTRAVENOUS ONCE
Refills: 0 | Status: DISCONTINUED | OUTPATIENT
Start: 2020-02-16 | End: 2020-02-16

## 2020-02-16 RX ORDER — FENTANYL CITRATE 50 UG/ML
1 INJECTION INTRAVENOUS
Qty: 0 | Refills: 0 | DISCHARGE

## 2020-02-16 RX ORDER — OXYCODONE HYDROCHLORIDE 5 MG/1
10 TABLET ORAL ONCE
Refills: 0 | Status: DISCONTINUED | OUTPATIENT
Start: 2020-02-16 | End: 2020-02-16

## 2020-02-16 RX ORDER — GUAIFENESIN/DEXTROMETHORPHAN 600MG-30MG
5 TABLET, EXTENDED RELEASE 12 HR ORAL
Qty: 0 | Refills: 0 | DISCHARGE

## 2020-02-16 RX ADMIN — Medication 100 MILLIEQUIVALENT(S): at 09:27

## 2020-02-16 RX ADMIN — POTASSIUM PHOSPHATE, MONOBASIC POTASSIUM PHOSPHATE, DIBASIC 83.33 MILLIMOLE(S): 236; 224 INJECTION, SOLUTION INTRAVENOUS at 09:26

## 2020-02-16 RX ADMIN — Medication 30 MILLIGRAM(S): at 03:14

## 2020-02-16 RX ADMIN — SODIUM CHLORIDE 1000 MILLILITER(S): 9 INJECTION INTRAMUSCULAR; INTRAVENOUS; SUBCUTANEOUS at 01:59

## 2020-02-16 RX ADMIN — SODIUM CHLORIDE 700 MILLILITER(S): 9 INJECTION INTRAMUSCULAR; INTRAVENOUS; SUBCUTANEOUS at 01:15

## 2020-02-16 RX ADMIN — SODIUM CHLORIDE 700 MILLILITER(S): 9 INJECTION INTRAMUSCULAR; INTRAVENOUS; SUBCUTANEOUS at 01:59

## 2020-02-16 RX ADMIN — Medication 5.32 MICROGRAM(S)/KG/MIN: at 16:46

## 2020-02-16 RX ADMIN — CISATRACURIUM BESYLATE 10.21 MICROGRAM(S)/KG/MIN: 2 INJECTION INTRAVENOUS at 09:20

## 2020-02-16 RX ADMIN — Medication 125 MILLIGRAM(S): at 00:03

## 2020-02-16 RX ADMIN — Medication 60 MILLIGRAM(S): at 06:48

## 2020-02-16 RX ADMIN — PIPERACILLIN AND TAZOBACTAM 200 GRAM(S): 4; .5 INJECTION, POWDER, LYOPHILIZED, FOR SOLUTION INTRAVENOUS at 01:15

## 2020-02-16 RX ADMIN — PIPERACILLIN AND TAZOBACTAM 3.38 GRAM(S): 4; .5 INJECTION, POWDER, LYOPHILIZED, FOR SOLUTION INTRAVENOUS at 02:30

## 2020-02-16 RX ADMIN — Medication 50 MILLIGRAM(S): at 05:12

## 2020-02-16 RX ADMIN — PIPERACILLIN AND TAZOBACTAM 25 GRAM(S): 4; .5 INJECTION, POWDER, LYOPHILIZED, FOR SOLUTION INTRAVENOUS at 09:23

## 2020-02-16 RX ADMIN — SODIUM CHLORIDE 1000 MILLILITER(S): 9 INJECTION, SOLUTION INTRAVENOUS at 07:04

## 2020-02-16 RX ADMIN — PROPOFOL 13.61 MICROGRAM(S)/KG/MIN: 10 INJECTION, EMULSION INTRAVENOUS at 06:21

## 2020-02-16 RX ADMIN — PIPERACILLIN AND TAZOBACTAM 25 GRAM(S): 4; .5 INJECTION, POWDER, LYOPHILIZED, FOR SOLUTION INTRAVENOUS at 16:45

## 2020-02-16 RX ADMIN — Medication 4 PUFF(S): at 14:33

## 2020-02-16 RX ADMIN — Medication 250 MILLIGRAM(S): at 06:46

## 2020-02-16 RX ADMIN — ALBUTEROL 4 PUFF(S): 90 AEROSOL, METERED ORAL at 14:33

## 2020-02-16 RX ADMIN — SODIUM CHLORIDE 500 MILLILITER(S): 9 INJECTION INTRAMUSCULAR; INTRAVENOUS; SUBCUTANEOUS at 03:50

## 2020-02-16 RX ADMIN — SODIUM CHLORIDE 500 MILLILITER(S): 9 INJECTION INTRAMUSCULAR; INTRAVENOUS; SUBCUTANEOUS at 02:53

## 2020-02-16 RX ADMIN — Medication 100 MILLIEQUIVALENT(S): at 08:29

## 2020-02-16 RX ADMIN — PANTOPRAZOLE SODIUM 40 MILLIGRAM(S): 20 TABLET, DELAYED RELEASE ORAL at 12:46

## 2020-02-16 RX ADMIN — FENTANYL CITRATE 50 MICROGRAM(S): 50 INJECTION INTRAVENOUS at 17:52

## 2020-02-16 RX ADMIN — Medication 5.32 MICROGRAM(S)/KG/MIN: at 06:22

## 2020-02-16 RX ADMIN — Medication 100 MILLIGRAM(S): at 12:46

## 2020-02-16 RX ADMIN — Medication 3 MILLILITER(S): at 00:03

## 2020-02-16 RX ADMIN — SODIUM CHLORIDE 3 MILLILITER(S): 9 INJECTION INTRAMUSCULAR; INTRAVENOUS; SUBCUTANEOUS at 00:03

## 2020-02-16 RX ADMIN — MIDODRINE HYDROCHLORIDE 5 MILLIGRAM(S): 2.5 TABLET ORAL at 04:15

## 2020-02-16 RX ADMIN — SODIUM CHLORIDE 1000 MILLILITER(S): 9 INJECTION INTRAMUSCULAR; INTRAVENOUS; SUBCUTANEOUS at 00:03

## 2020-02-16 RX ADMIN — FENTANYL CITRATE 2.83 MICROGRAM(S)/KG/HR: 50 INJECTION INTRAVENOUS at 09:57

## 2020-02-16 RX ADMIN — Medication 3 MILLILITER(S): at 08:49

## 2020-02-16 RX ADMIN — PROPOFOL 13.61 MICROGRAM(S)/KG/MIN: 10 INJECTION, EMULSION INTRAVENOUS at 12:45

## 2020-02-16 RX ADMIN — ENOXAPARIN SODIUM 40 MILLIGRAM(S): 100 INJECTION SUBCUTANEOUS at 12:45

## 2020-02-16 NOTE — DISCHARGE NOTE FOR THE EXPIRED PATIENT - HOSPITAL COURSE
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  He presented with dyspnea and progressed to acute hypoxemic respiratory failure with ARDS, septic shock, WIL, lactic acidosis.    --continue propofol for sedation  add fentanyl gtt (on chronic narcotics)  cisatrocurium gtt for vent synchrony, titrate to TOF 2-3/4  --likely septic shock, continue levophed  fluid bolus overnight seemed to help transiently but will hold on more fluid given FiO2 100  check echo  --acute hypoxemic respiratory failure from ARDS and influenza  LTVV, current TV at goal with acceptable Pplat  tried higher PEEP but did not respond well  serial ABGs  not a candidate for additional ARDS therapies given his underlying advanced malignancy  --WIL in setting of shock  check urine lytes and renal US  --NGT placed for TF  --influenza A and possible superimposed bacterial pneumonia in an immunocompromised host  continue Tamiflu, vanc, zosyn  f/u panCx including MRSA swab  hold home bactrim and fluconazole  --stress dose steroids for adrenal insufficiency given baseline dexamethasone use  --check fs  --plan discussed in detail with family.  It is possible though unlikely that he will survive this acute event and if he does it will be a prolonged recovery process.  His family feels he has been suffering for a long time, and they are considering removing him from life support.   pt developed worsening shock this afternoon.  Family would like to pursue comfort care only.  Pt extubated and life support disc

## 2020-02-16 NOTE — H&P ADULT - PROBLEM SELECTOR PLAN 2
WITH SEPTIC SHOCK -ON LEVAPHED AND STRESS STEROIDS ,IV HYDRATION ,SERIAL LACTATE Admitted for septic workup and evaluation,send blood and urine cx,serial lactate levels,monitor vitals closley,ivfs hydration,monitor urine output and renal profile,iv abx initiated -on vanco and zosyn ,seen by ID CONSULT

## 2020-02-16 NOTE — GOALS OF CARE CONVERSATION - ADVANCED CARE PLANNING - CONVERSATION DETAILS
Discussed worsening shock.  Family feels he is suffering and want to proceed with comfort measures only.  We will extubate and stop all meds except comfort meds

## 2020-02-16 NOTE — CONSULT NOTE ADULT - ASSESSMENT
pt with pn - respiratory failure  intubated  and -on respirator   on zosyn and vancomycon  influenza- on tamiflu  hypotension- on vasopressor  s/p ca of oesophagus with met to the brain  s/p ca bladdar  copd  zelda  ekg - sinus tachy . ? old  anteroseptal wall mi  prognosis is poor

## 2020-02-16 NOTE — H&P ADULT - ASSESSMENT
Patient  is a 73 yo Male with med history   of HLD, HTN, COPD( pt has  home oxygen but as per wife is usually on room air), TERESA, GERD ,constipation , bladder CA, lung CA/ esophogeal cancer with mets to the brain, s/p wedge resection of lung s/p radiation ,not on chemotherapy, h/o laminectomy brought to ED by his wife due to  difficulty breathing with cough and congestion ,that  started  yesterday and  continued to worsen and they started him on his home oxygen and began nebulizer treatments. Patient became lethargic and more  disoriented , they increased his oxygen and called EMS.  On EMS arrival pt SPO2 was noted to be in the low 80s while on 10 liters NC.  Pt was placed on CPAP and pt was taken to the ED for further work up Pt is a found to have Flu A and pneumonia evolving into hypoxemic respiratory failure with ARDS, shock intubated this am for worsening hypoxemia Admitted for septic workup and evaluation ,send blood and urine cx,serial lactate levels,monitor vitals closley,ivfs hydration,monitor urine output and renal profile,iv abx initiated ,ID CONS called   Admitted to INTENSIVE CARE UNIT

## 2020-02-16 NOTE — DIETITIAN INITIAL EVALUATION ADULT. - ENTERAL
Recommend TF of Vital 1.5 at 60ml x 20hrs for a total volume of 1,200ml/day, provides 1,800 kcal/day, 80 gr protein/day. Pt with questionable inadequate PO intake and wt hx PTA recommend to start TF slow and increase slowly/gradually as a preventative measure for possible refeeding syndrome, recommend to start at 10ml/hr and increase by 10ml every 8 hours until goal rate is reached.

## 2020-02-16 NOTE — CONSULT NOTE ADULT - SUBJECTIVE AND OBJECTIVE BOX
CARDIOLOGY CONSULT NOTE    Patient is a 74y Male with a known history of :  Prophylactic measure (Z29.9)  Brain metastases (C79.31)  TERESA (obstructive sleep apnea) (G47.33)  Chronic GERD (K21.9)  Esophageal cancer (C15.9)  Chronic obstructive pulmonary disease, unspecified COPD type (J44.9)  Influenza (J11.1)  Pneumonia (J18.9)  Sepsis (A41.9)  Acute respiratory failure (J96.00)    HPI:  Patient  is a 73 yo Male with med history   of HLD, HTN, COPD( pt has  home oxygen but as per wife is usually on room air), TERESA, GERD ,constipation , bladder CA, lung CA/ esophogeal cancer with mets to the brain, s/p wedge resection of lung s/p radiation ,not on chemotherapy, h/o laminectomy brought to ED by his wife due to  difficulty breathing with cough and congestion ,that  started  yesterday and  continued to worsen and they started him on his home oxygen and began nebulizer treatments. Patient became lethargic and more  disoriented , they increased his oxygen and called EMS.  On EMS arrival pt SPO2 was noted to be in the low 80s while on 10 liters NC.  Pt was placed on CPAP and pt was taken to the ED for further work up Pt is a found to have Flu A and pneumonia evolving into hypoxemic respiratory failure with ARDS, shock intubated this am for worsening hypoxemia Admitted for septic workup and evaluation ,send blood and urine cx,serial lactate levels,monitor vitals closley,ivfs hydration,monitor urine output and renal profile,iv abx initiated ,ID CONS called   Admitted to INTENSIVE CARE UNIT (16 Feb 2020 07:51)      REVIEW OF SYSTEMS:    CONSTITUTIONAL: No fever, weight loss, or fatigue  EYES: No eye pain, visual disturbances, or discharge  ENMT:  No difficulty hearing, tinnitus, vertigo; No sinus or throat pain  NECK: No pain or stiffness  BREASTS: No pain, masses, or nipple discharge  RESPIRATORY: No cough, wheezing, chills or hemoptysis; No shortness of breath  CARDIOVASCULAR: No chest pain, palpitations, dizziness, or leg swelling  GASTROINTESTINAL: No abdominal or epigastric pain. No nausea, vomiting, or hematemesis; No diarrhea or constipation. No melena or hematochezia.  GENITOURINARY: No dysuria, frequency, hematuria, or incontinence  NEUROLOGICAL: No headaches, memory loss, loss of strength, numbness, or tremors  SKIN: No itching, burning, rashes, or lesions   LYMPH NODES: No enlarged glands  ENDOCRINE: No heat or cold intolerance; No hair loss  MUSCULOSKELETAL: No joint pain or swelling; No muscle, back, or extremity pain  PSYCHIATRIC: No depression, anxiety, mood swings, or difficulty sleeping  HEME/LYMPH: No easy bruising, or bleeding gums  ALLERGY AND IMMUNOLOGIC: No hives or eczema    MEDICATIONS  (STANDING):  ALBUTerol    90 MICROgram(s) HFA Inhaler 4 Puff(s) Inhalation every 6 hours  chlorhexidine 4% Liquid 1 Application(s) Topical <User Schedule>  cisatracurium Infusion 3 MICROgram(s)/kG/Min (10.206 mL/Hr) IV Continuous <Continuous>  enoxaparin Injectable 40 milliGRAM(s) SubCutaneous daily  fentaNYL   Infusion. 0.5 MICROgram(s)/kG/Hr (2.835 mL/Hr) IV Continuous <Continuous>  hydrocortisone sodium succinate Injectable 100 milliGRAM(s) IV Push every 8 hours  ipratropium 17 MICROgram(s) HFA Inhaler 4 Puff(s) Inhalation every 6 hours  norepinephrine Infusion 0.05 MICROgram(s)/kG/Min (5.316 mL/Hr) IV Continuous <Continuous>  oseltamivir 30 milliGRAM(s) Oral two times a day  pantoprazole  Injectable 40 milliGRAM(s) IV Push daily  piperacillin/tazobactam IVPB.. 3.375 Gram(s) IV Intermittent every 8 hours  propofol Infusion 40 MICROgram(s)/kG/Min (13.608 mL/Hr) IV Continuous <Continuous>  vancomycin  IVPB 1000 milliGRAM(s) IV Intermittent every 24 hours    MEDICATIONS  (PRN):  artificial  tears Solution 1 Drop(s) Both EYES every 2 hours PRN eye dryness  sodium chloride 0.9% lock flush 10 milliLiter(s) IV Push every 1 hour PRN Pre/post blood products, medications, blood draw, and to maintain line patency      ALLERGIES: No Known Allergies      Social History:     FAMILY HISTORY:      PAST MEDICAL & SURGICAL HISTORY:  Brain metastases  Constipation  Chronic GERD  HLD (hyperlipidemia)  TERESA (obstructive sleep apnea)  Chronic obstructive pulmonary disease, unspecified COPD type  Lung cancer  Bladder cancer  Hypertension  Chronic obstructive pulmonary disease  Cancer: esophagus mets to brain  H/O laminectomy  H/O hemorrhoidectomy  S/P lobectomy of lung        PHYSICAL EXAMINATION:  -----------------------------  T(C): 37.6 (02-16-20 @ 08:00), Max: 37.6 (02-16-20 @ 08:00)  HR: 110 (02-16-20 @ 09:11) (85 - 124)  BP: 90/68 (02-16-20 @ 09:00) (72/47 - 147/93)  RR: 24 (02-16-20 @ 09:00) (16 - 36)  SpO2: 91% (02-16-20 @ 09:11) (84% - 97%)  Wt(kg): --    02-15 @ 07:01  -  02-16 @ 07:00  --------------------------------------------------------  IN:    Lactated Ringers IV Bolus: 1000 mL    norepinephrine Infusion: 5.3 mL    propofol Infusion: 7 mL    Solution: 250 mL  Total IN: 1262.3 mL    OUT:  Total OUT: 0 mL    Total NET: 1262.3 mL        Height (cm): 177.8 (02-15 @ 23:28)  Weight (kg): 56.7 (02-15 @ 23:28)  BMI (kg/m2): 17.9 (02-15 @ 23:28)  BSA (m2): 1.71 (02-15 @ 23:28)    Constitutional: well developed, normal appearance, well groomed, well nourished, no deformities and no acute distress.   Eyes: the conjunctiva exhibited no abnormalities and the eyelids demonstrated no xanthelasmas.   HEENT: normal oral mucosa, no oral pallor and no oral cyanosis.   Neck: normal jugular venous A waves present, normal jugular venous V waves present and no jugular venous daniel A waves.   Pulmonary: no respiratory distress, normal respiratory rhythm and effort, no accessory muscle use and lungs were clear to auscultation bilaterally.   Cardiovascular: heart rate and rhythm were normal, normal S1 and S2 and no murmur, gallop, rub, heave or thrill are present.   Abdomen: soft, non-tender, no hepato-splenomegaly and no abdominal mass palpated.   Musculoskeletal: the gait could not be assessed..   Extremities: no clubbing of the fingernails, no localized cyanosis, no petechial hemorrhages and no ischemic changes.   Skin: normal skin color and pigmentation, no rash, no venous stasis, no skin lesions, no skin ulcer and no xanthoma was observed.   Psychiatric: oriented to person, place, and time, the affect was normal, the mood was normal and not feeling anxious.     LABS:   --------  02-16    140  |  106  |  35<H>  ----------------------------<  71  3.6   |  22  |  1.40<H>    Ca    7.3<L>      16 Feb 2020 05:40  Phos  1.7     02-16  Mg     2.0     02-16    TPro  5.8<L>  /  Alb  2.2<L>  /  TBili  0.7  /  DBili  x   /  AST  46<H>  /  ALT  60  /  AlkPhos  73  02-16                         14.9   2.41  )-----------( 106      ( 16 Feb 2020 05:40 )             43.8     PT/INR - ( 16 Feb 2020 00:04 )   PT: 12.3 sec;   INR: 1.10 ratio         PTT - ( 16 Feb 2020 00:04 )  PTT:27.5 sec  02-16 @ 00:04 BNP: 2347 pg/mL            RADIOLOGY:  -----------------        ECG:     ECHO

## 2020-02-16 NOTE — CONSULT NOTE ADULT - ASSESSMENT
PATIENT SUMMARY    75 yo M with Hx esophageal cancer, lung cancer with brain mets, on chronic dexamethasone presenting with dyspnea, cough, and congestion.  Found to have FluA evolving into hypoxemic respiratory failure with ARDS, shock  intubated 2020 for worsening hypoxemia  RIJ TLC placed Pt started on Vanoc zosyn tamiflu 2020   Pulm consulted 2020        Past Medical History:  Bladder cancer    Cancer  esophagus mets to brain  Chronic obstructive pulmonary disease    Chronic obstructive pulmonary disease, unspecified COPD type    HLD (hyperlipidemia)    Hypertension    Lung cancer    TERESA (obstructive sleep apnea).     Past Surgical History:  H/O hemorrhoidectomy    H/O laminectomy    S/P lobectomy of lung.        HOSPITAL COURSE  EVENTS    2020 Admitted ICU        PATIENT DATA / RECOMMENDATIONS            HO COPD   HO TERESA   HO LUNG CA SP LOBECTOMY LUNG   INTUBATION done 2020  MECHANICAL VENT started 2020   A/R Recommend ltvv dsv dsbt as feasibnle   SEVERE HYP[OXEMIC RESP FAILURE poa 2020   A/R Pt is in severe ac hypoxemic resp failure fromflu pneumonia and if he continues rapid deterioration ecmo may be considered  SHOCK poa 2020     A/R 2020 On vasopressore  steroids Target MAP 65   FLU A poa 2020            A/R On Tamiflu                   LACTICEMIA poa 2020       LEUKOPENIA poa 2020  THROMBOCYTOPENIA poa 2020   A/R consstent with sever pneumonia   WIL poa 2020   A/R Monitor lytes uo supp care   HO BLADDER CA   HO ESOPHAGEAL CA WITH METS BRAIN         TIME SPENT Over 55 minutes aggregate care time spent on encounter; activities included   direct pt care, counseling and/or coordinating care reviewing notes, lab data/ imaging , discussion with multidisciplinary team/ pt /family. Risks, benefits, alternatives  discussed in detail.      MARLENE COHEN [facility] [phone]  1945 DOA 2020 DR LILIA CARTER

## 2020-02-16 NOTE — CONSULT NOTE ADULT - ASSESSMENT
74 year old male with PMH esophageal cancer, HLD, HTN, COPD, TERESA, bladder CA, lung CA with mets to the brain, s/p wedge resection of lung, and s/p laminectomy  Pt reports he has been SOB and nasal congestion x 2 days, accompanied by cough (minimally productive), weakness, back and headache. Admitted to ICU with acute hypoxemic respiratory failure (sat is 87-91% on 100% HFNC), flu A+, multifocal PNA., hypotension, severe sepsis (with significant bandemia)    Plan:  Neuro: no active issues  CV: continue IVF resuscitation, if remains hypotensive will add norepinephrine and titrate to maintain MAP 65-75  Pulm: continue HFNC, low threshold to intubate, continue nebs, stress dose steroids  GI: NPO, protonix for GI PPX  : no active issues  Renal: GFR 50s, tamiflu renally adjusted, adjust meds per CrCl, Hold nephrotoxic meds, Trend urine output, Trend BMP   Heme: no active issues, lovenox (and SCDs) for DVT PPX  ID: continue tamiflu (renally adjusted), continue zosyn, f/u blood, urine and sputum cxs  Endo: no active issues  Misc: palliative care consult for GOC/advanced directives    Above as d/w Dr Valladares (EICU attending)

## 2020-02-16 NOTE — CONSULT NOTE ADULT - REASON FOR ADMISSION
SHORTNESS OF BREATH
SHORTNESS OF BREATH
acute hypoxemic respiratory failure, flu A +, multifocal PNA
SHORTNESS OF BREATH

## 2020-02-16 NOTE — PROCEDURE NOTE - NSPROCDETAILS_GEN_ALL_CORE
orogastric/audible air bolus
sterile dressing applied/lumen(s) aspirated and flushed/guidewire recovered/sterile technique, catheter placed/ultrasound guidance

## 2020-02-16 NOTE — H&P ADULT - PROBLEM SELECTOR PLAN 8
2/2 TO MULTIFOCAL PNEUMONIA AND VIRAL SYNDROME , SERIAL ABGS , CHEST XRAYS , MANAGEMENT OF VENTILATOR AS PER ICU TEAM

## 2020-02-16 NOTE — H&P ADULT - NSICDXPASTMEDICALHX_GEN_ALL_CORE_FT
PAST MEDICAL HISTORY:  Bladder cancer     Brain metastases     Cancer esophagus mets to brain    Chronic GERD     Chronic obstructive pulmonary disease     Chronic obstructive pulmonary disease, unspecified COPD type     Constipation     HLD (hyperlipidemia)     Hypertension     Lung cancer     TERESA (obstructive sleep apnea)

## 2020-02-16 NOTE — H&P ADULT - PROBLEM SELECTOR PLAN 3
Admitted for septic workup and evaluation,send blood and urine cx,serial lactate levels,monitor vitals closley,ivfs hydration,monitor urine output and renal profile,iv abx initiated -vanco and zosyn Sputum cx ordered ,seen by ID CONS & PULM

## 2020-02-16 NOTE — CONSULT NOTE ADULT - PROBLEM SELECTOR RECOMMENDATION 9
compelling for pulmonary source with bilateral infiltrates, hypoxemia, and FluA+ recommend:  -tamiflu x 5 days renally dosed  -Zosyn/Vanco pending further micro data with vanco goal trough 15-20  -check nares MRSA PCR and send sputum as ordered  -further recs to follow

## 2020-02-16 NOTE — CONSULT NOTE ADULT - SUBJECTIVE AND OBJECTIVE BOX
MARLENE OCHEN [facility] [phone]  1945 DOA 2020 DR LILIA ANDRADE   ALLERGY   nka      CONTACT sp Shade D             Initial evaluation/Pulmonary Critical Care consultation requested on  2020  by Dr Andrade    from Dr Hunter   Patient examined chart reviewed    HOSPITAL ADMISSION   PATIENT CAME  FROM (if information available)        MARLENE PETER [facility] [phone]  1945 DOA 2020 DR LILIA ANDRADE   ALLERGY   nka      CONTACT sp Shade D      REVIEW OF SYMPTOMS      Able to give ROS  NO     PHYSICAL EXAM    HEENT Unremarkable PERRLA atraumatic   RESP Fair air entry EXP prolonged    Harsh breath sound Resp distres mild   CARDIAC S1 S2 No S3     NO JVD    ABDOMEN SOFT BS PRESENT NOT DISTENDED No hepatosplenomegaly PEDAL EDEMA present No calf tenderness  NO rash   GENERAL Not TOXIC looking    PT DATA/BEST PRACTICE  ALLERGY  nka   NOTEWORTHY  POINTS/CHANGES ROS/PE                                  WT  56 (2020)      BMI  17 (2020)         ADVANCED DIRECTIVE       Goals of care discussion                                                                                      HEAD OF BED ELEVATION Yes  DYSPHAGIA EVAL                                  DIET   npo ()                                       IV F                                                       DVT PROPHYLAXIS   enoxaparin 40 ()                PT DATA/BEST PRACTICE  ALLERGY  nka   NOTEWORTHY  POINTS/CHANGES ROS/PE                                  WT  56 (2020)      BMI  17 (2020)         ADVANCED DIRECTIVE       Goals of care discussion                                                                                      HEAD OF BED ELEVATION Yes  DYSPHAGIA EVAL                                  DIET   npo ()                                       IV F                                                       DVT PROPHYLAXIS   enoxaparin 40 ()                   STRESS ULCER PROPHYLAXIS     protonix 40 ()                                                                INFECTION PPLX  chlorhexidine 4% ()   PROCEDURE      RIJ ccpa    2020 gastric intuibation  2020 ET intubation                         LINES/TUBES POA                LINES TUBES                  ECHO                  CXR     2020 cxr mf pneu rml lll                       CT                                                                              MICROBIO            M 2020 Flua        ABIO                oseltamivir 30.2.5d () (Flu)  zosyn . 5d  (2020)  (pneu)   Vanco 1 () (pneu)                     NM PARALYSIS   Cisatracurium 200 in 200 3 m/k/m ( 6a)  SEDATION  Fentanyl 2500 in 250 0.5 m/k/h ( 9a)   Propofol 1000 in 100 40 m/k/m ( 4a)   STRESS STEROIDS   Hydrocort 100.3 ( 7a)   SHOCK  Norepinephrine 8 in 250 0.05 m/k/m ( 5a)   LACTICEMIA   l 2020 LA 3.4-2.5-4.5-2.5   WIL   C -2020   Cr 1.3-1.4    ABGs  2020 6a 730/45/93 on ac 18/500/1/+10       2020 5a 746/27/50 on vapotherm 100% 40l      2020 12a 746/29/63  on vapotherm 100% 40l

## 2020-02-16 NOTE — PATIENT PROFILE ADULT - LOCATION #1
Pt isabel 45 min eval fair. Pt agreed to session. Pt s/p L THR, posterior precautions. Pt rec'd in bed, +hip pillow, peripheral IV line and NAD. PT reviewed hip precautions. Pt able to perform bridge, sensation intact throughout BLEs (including hips).
sacrum

## 2020-02-16 NOTE — CONSULT NOTE ADULT - SUBJECTIVE AND OBJECTIVE BOX
**additional information obtained from second MRN (# [id number])    Reason for consult:  74 year old male with PMH esophageal cancer, HLD, HTN, COPD, TERESA, bladder CA, lung CA with mets to the brain, s/p wedge resection of lung, and s/p laminectomy  called to evaluate pt for ICU admission by Dr Cook secondary to acute hypoxemic respiratory failure (sat is 87-91% on 100% HFNC), flu A+, multifocal PNA, hypotension, severe sepsis.   Pt reports he has been SOB and nasal congestion x 2 days, accompanied by cough (minimally productive), weakness, back and headache. Denies CP, N/V, fever/chills, myalgias, abd pain.   Per ED attending interview with wife who is not available: used home O2 and nebulizer without improvement, was disoriented at home so EMS called. EMS reports sat in low 80s, CPAP tried but poorly tolerated.    Review of Systems: limited respiratory distress (speaking causes pt to cough and desat), also pt is very hard of hearing,            SOCIAL HISTORY:  Smoker: former (unable to obtain details at this time  ETOH use: unknown (hx of ETOH abuse per old chart)  [employer] Drug use: unknown    Occupation: unknown  Lives with: wife  Assist device ue:    Code Status: full code  HCP: per ED attending conversation with wife, none      Home Medications:  Advair  mcg-21 mcg/inh inhalation aerosol: 2 puff(s) inhaled 2 times a day (15 Feb 2020 23:41)  Bactrim  mg-160 mg oral tablet: 1 tab(s) orally Tuesday, Thursday, Saturday (15 Feb 2020 23:42)  dexamethasone 4 mg oral tablet: 1 tab(s) orally 2 times a day (15 Feb 2020 23:43)  Diflucan 40 mg/mL oral liquid: 5 milliliter(s) orally once a day (15 Feb 2020 23:43)  fentaNYL 25 mcg/hr transdermal film, extended release: 1 film(s) transdermal every 72 hours (15 Feb 2020 23:43)  GuaiFENesin DM 10 mg-100 mg/5 mL oral liquid: 5 milliliter(s) orally 2 times a day, As Needed (15 Feb 2020 23:46)  Little Noses 0.65% nasal spray: 2 spray(s) nasal 2 times a day, As Needed (15 Feb 2020 23:46)  metoclopramide 10 mg oral tablet: 1 tab(s) orally every 8 hours, As Needed (15 Feb 2020 23:47)  NexIUM 24HR 20 mg oral delayed release capsule: 1 cap(s) orally 2 times a day (15 Feb 2020 23:44)  oxyCODONE 10 mg oral tablet: 1 tab(s) orally every 4 hours, As Needed (15 Feb 2020 23:47)  polyethylene glycol 3350 oral powder for reconstitution:  (15 Feb 2020 23:48)  senna 8.6 mg oral tablet: 1 tab(s) orally once a day (at bedtime) (15 Feb 2020 23:48)  Singulair 10 mg oral tablet: 1 tab(s) orally once a day (15 Feb 2020 23:44)  sucralfate 1 g oral tablet: 1 tab(s) orally 3 times a day (before meals) (15 Feb 2020 23:45)  Ventolin HFA 90 mcg/inh inhalation aerosol: 1 puff(s) inhaled every 6 hours (15 Feb 2020 23:48)    MEDICATIONS  (STANDING):  midodrine. 5 milliGRAM(s) Oral once  oseltamivir 30 milliGRAM(s) Oral two times a day    No Known Allergies                                                            LABS:                        16.3   4.20  )-----------( 157      ( 15 Feb 2020 23:39 )             46.2     Manual Differential (02.15.20 @ 23:39)    Band Neutrophils %: 21.0 %    02-16    136  |  101  |  42<H>  ----------------------------<  133<H>  4.5   |  24  |  1.30    Ca    8.4<L>      16 Feb 2020 00:04  TPro  7.0  /  Alb  2.6<L>  /  TBili  1.0  /  DBili  x   /  AST  63<H>  /  ALT  82<H>  /  AlkPhos  95  02-16    PT/INR - ( 16 Feb 2020 00:04 )   PT: 12.3 sec;   INR: 1.10 ratio    PTT - ( 16 Feb 2020 00:04 )  PTT:27.5 sec    Lactate, Blood (02.16.20 @ 02:13)    Lactate, Blood: 2.5 mmol/L    Lactate, Blood (02.16.20 @ 00:04)    Lactate, Blood: 3.4 mmol/L    Blood Gas Profile - Arterial (02.16.20 @ 00:42)    pH, Arterial: 7.46    pCO2, Arterial: 29 mmHg    pO2, Arterial: 63 mmHg    HCO3, Arterial: 23 mmol/L    Base Excess, Arterial: -3.2 mmol/L    Oxygen Saturation, Arterial: 92 %    FIO2, Arterial: 100    Blood Gas Comments Arterial: Vapotherm 100%/40LPM      2/16/20 CXR (my read): bilateral patchy opacities (right perihilar and left mid to base)      T(C): 37 (02-16-20 @ 00:15), Max: 37 (02-15-20 @ 23:28)  HR: 94 (02-16-20 @ 04:08) (88 - 124), SR  BP: 77/51 (02-16-20 @ 04:08) (77/51 - 116/70)  RR: 20 (02-16-20 @ 04:08) (20 - 36)  SpO2: 89% (02-16-20 @ 04:08) (85% - 93%) on 100% HFNC      Physical Exam  General: moderate respiratory distress, cachectic  Neuro: awake and alert, speech is whisper-like (only able to speak in words/phrases)                     Eyes: PERRL, EOMI   ENT: MMM   Neck: supple, no JVD, trachea midline   Chest: rhonchi throughout but clears with cough and expectoration. good air entry, equal bilaterally, tachypneic and mild accessory muscle use noted  Back: large thoracotomy scar  CV: regular rate, regular rhythm, +S1S2  GI: soft, NT, ND, +BS. Midline abd scar.  Extremities: SMYTH x 4, no edema  SKIN: warm, dry, intact

## 2020-02-16 NOTE — PROGRESS NOTE ADULT - SUBJECTIVE AND OBJECTIVE BOX
24 hour events:   75 yo M with Hx esophageal cancer, lung cancer with brain mets, on chronic dexamethasone presenting with dyspnea, cough, and congestion.  Found to have FluA evolving into hypoxemic respiratory failure with ARDS, shock  intubated this am for worsening hypoxemia  RIJ TLC placed    T(F): 98 (02-16-20 @ 05:11), Max: 98.7 (02-16-20 @ 04:30)  HR: 123 (02-16-20 @ 07:00) (88 - 124)  BP: 147/93 (02-16-20 @ 07:00) (77/51 - 147/93)  RR: 22 (02-16-20 @ 07:00) (16 - 36)  SpO2: 92% (02-16-20 @ 07:00) (84% - 97%)  Wt(kg): --    Mode: AC/ CMV (Assist Control/ Continuous Mandatory Ventilation), RR (machine): 22, TV (machine): 500, FiO2: 90, PEEP: 10  02-15-20 @ 07:01  -  02-16-20 @ 07:00  --------------------------------------------------------  IN: 1262.3 mL / OUT: 0 mL / NET: 1262.3 mL        CAPILLARY BLOOD GLUCOSE          I&O's Summary    15 Feb 2020 07:01  -  16 Feb 2020 07:00  --------------------------------------------------------  IN: 1262.3 mL / OUT: 0 mL / NET: 1262.3 mL        Physical Exam:   Gen:  Neuro:  HEENT:  Resp:  CVS:  Abd:  Ext:  Skin:    Meds:  oseltamivir Oral  piperacillin/tazobactam IVPB.. IV Intermittent  vancomycin  IVPB IV Intermittent    norepinephrine Infusion IV Continuous    hydrocortisone sodium succinate Injectable IV Push    albuterol/ipratropium for Nebulization Nebulizer    cisatracurium Infusion IV Continuous  propofol Infusion IV Continuous      enoxaparin Injectable SubCutaneous    pantoprazole  Injectable IV Push      potassium chloride  20 mEq/100 mL IVPB IV Intermittent  sodium chloride 0.9% lock flush IV Push PRN      chlorhexidine 4% Liquid Topical                              14.9   2.41  )-----------( 106      ( 16 Feb 2020 05:40 )             43.8     Bands 21.0    02-16    140  |  106  |  35<H>  ----------------------------<  71  3.6   |  22  |  1.40<H>    Ca    7.3<L>      16 Feb 2020 05:40  Phos  1.7     02-16  Mg     2.0     02-16    TPro  5.8<L>  /  Alb  2.2<L>  /  TBili  0.7  /  DBili  x   /  AST  46<H>  /  ALT  60  /  AlkPhos  73  02-16    Lactate 4.5           02-16 @ 05:40    Lactate 2.5           02-16 @ 02:13    Lactate 3.4           02-16 @ 00:04          PT/INR - ( 16 Feb 2020 00:04 )   PT: 12.3 sec;   INR: 1.10 ratio         PTT - ( 16 Feb 2020 00:04 )  PTT:27.5 sec                Radiology: ***  Bedside ultrasound: ***    CENTRAL LINE: N/Y          DATE INSERTED:              REMOVE: Y/N  PHILLIP: N/Y                       DATE INSERTED:              REMOVE: Y/N  A-LINE: N/Y                       DATE INSERTED:              REMOVE: Y/N    GLOBAL ISSUE/BEST PRACTICE:  Analgesia:  Sedation:  CAM-ICU:   HOB elevation: yes  Stress ulcer prophylaxis:  VTE prophylaxis:  Glycemic control:  Nutrition:    CODE STATUS: *** 24 hour events:   73 yo M with Hx esophageal cancer, lung cancer with brain mets, on chronic dexamethasone presenting with dyspnea, cough, and congestion.  Found to have FluA evolving into hypoxemic respiratory failure with ARDS, shock  intubated this am for worsening hypoxemia  RIJ TLC placed    T(F): 98 (02-16-20 @ 05:11), Max: 98.7 (02-16-20 @ 04:30)  HR: 123 (02-16-20 @ 07:00) (88 - 124)  BP: 147/93 (02-16-20 @ 07:00) (77/51 - 147/93)  RR: 22 (02-16-20 @ 07:00) (16 - 36)  SpO2: 92% (02-16-20 @ 07:00) (84% - 97%)  Wt(kg): --    Mode: AC/ CMV (Assist Control/ Continuous Mandatory Ventilation), RR (machine): 22, TV (machine): 500, FiO2: 90, PEEP: 10    on AC/22/440/100/10 peak 28, plat 24 with SpO2 90%  trial of increasing PEEP to 16 but SpO2 dropped 90% to 88% and Ppeak 40, plat 35          02-15-20 @ 07:01  -  02-16-20 @ 07:00  --------------------------------------------------------  IN: 1262.3 mL / OUT: 0 mL / NET: 1262.3 mL        CAPILLARY BLOOD GLUCOSE          I&O's Summary    15 Feb 2020 07:01  -  16 Feb 2020 07:00  --------------------------------------------------------  IN: 1262.3 mL / OUT: 0 mL / NET: 1262.3 mL        Physical Exam:   Gen: intubated and sedated, critically ill  Neuro: off sedation followed commands with RN  HEENT: PERRL  Resp: CTABL  CVS: tachy, regular, distant  Abd: soft, NTND  Ext: atrophied LE b/l, no edema   Skin: WWP    Meds:  oseltamivir Oral  piperacillin/tazobactam IVPB.. IV Intermittent  vancomycin  IVPB IV Intermittent    norepinephrine Infusion IV Continuous    hydrocortisone sodium succinate Injectable IV Push    albuterol/ipratropium for Nebulization Nebulizer    cisatracurium Infusion IV Continuous  propofol Infusion IV Continuous      enoxaparin Injectable SubCutaneous    pantoprazole  Injectable IV Push      potassium chloride  20 mEq/100 mL IVPB IV Intermittent  sodium chloride 0.9% lock flush IV Push PRN      chlorhexidine 4% Liquid Topical                              14.9   2.41  )-----------( 106      ( 16 Feb 2020 05:40 )             43.8     Bands 21.0    02-16    140  |  106  |  35<H>  ----------------------------<  71  3.6   |  22  |  1.40<H>    Ca    7.3<L>      16 Feb 2020 05:40  Phos  1.7     02-16  Mg     2.0     02-16    TPro  5.8<L>  /  Alb  2.2<L>  /  TBili  0.7  /  DBili  x   /  AST  46<H>  /  ALT  60  /  AlkPhos  73  02-16    Lactate 4.5           02-16 @ 05:40    Lactate 2.5           02-16 @ 02:13    Lactate 3.4           02-16 @ 00:04          PT/INR - ( 16 Feb 2020 00:04 )   PT: 12.3 sec;   INR: 1.10 ratio         PTT - ( 16 Feb 2020 00:04 )  PTT:27.5 sec                Radiology: ***  Bedside ultrasound: B line predominant, unable to visualize costophrenic angles, poor echo windows, no pericardial effusion, IVC 1.7cm without variation    CENTRAL LINE: RIJ TLC  PHILLIP: Y  A-LINE: N    GLOBAL ISSUE/BEST PRACTICE:  Analgesia: Y  Sedation:  Y  CAM-ICU:  NOEL  HOB elevation: yes  Stress ulcer prophylaxis: Y  VTE prophylaxis: Y  Glycemic control: Y  Nutrition: N    CODE STATUS: FULL 24 hour events:   75 yo M with Hx esophageal cancer, lung cancer with brain mets, on chronic dexamethasone presenting with dyspnea, cough, and congestion.  Found to have FluA evolving into hypoxemic respiratory failure with ARDS, shock  intubated this am for worsening hypoxemia  RIJ TLC placed    T(F): 98 (02-16-20 @ 05:11), Max: 98.7 (02-16-20 @ 04:30)  HR: 123 (02-16-20 @ 07:00) (88 - 124)  BP: 147/93 (02-16-20 @ 07:00) (77/51 - 147/93)  RR: 22 (02-16-20 @ 07:00) (16 - 36)  SpO2: 92% (02-16-20 @ 07:00) (84% - 97%)  Wt(kg): --    Mode: AC/ CMV (Assist Control/ Continuous Mandatory Ventilation), RR (machine): 22, TV (machine): 500, FiO2: 90, PEEP: 10    on AC/22/440/100/10 peak 28, plat 24 with SpO2 90%  trial of increasing PEEP to 16 but SpO2 dropped 90% to 88% and Ppeak 40, plat 35          02-15-20 @ 07:01  -  02-16-20 @ 07:00  --------------------------------------------------------  IN: 1262.3 mL / OUT: 0 mL / NET: 1262.3 mL        CAPILLARY BLOOD GLUCOSE          I&O's Summary    15 Feb 2020 07:01  -  16 Feb 2020 07:00  --------------------------------------------------------  IN: 1262.3 mL / OUT: 0 mL / NET: 1262.3 mL        Physical Exam:   Gen: intubated and sedated, critically ill  Neuro: off sedation followed commands with RN  HEENT: PERRL  Resp: CTABL  CVS: tachy, regular, distant  Abd: soft, NTND  Ext: atrophied LE b/l, no edema   Skin: WWP    Meds:  oseltamivir Oral  piperacillin/tazobactam IVPB.. IV Intermittent  vancomycin  IVPB IV Intermittent    norepinephrine Infusion IV Continuous    hydrocortisone sodium succinate Injectable IV Push    albuterol/ipratropium for Nebulization Nebulizer    cisatracurium Infusion IV Continuous  propofol Infusion IV Continuous      enoxaparin Injectable SubCutaneous    pantoprazole  Injectable IV Push      potassium chloride  20 mEq/100 mL IVPB IV Intermittent  sodium chloride 0.9% lock flush IV Push PRN      chlorhexidine 4% Liquid Topical                              14.9   2.41  )-----------( 106      ( 16 Feb 2020 05:40 )             43.8     Bands 21.0    02-16    140  |  106  |  35<H>  ----------------------------<  71  3.6   |  22  |  1.40<H>    Ca    7.3<L>      16 Feb 2020 05:40  Phos  1.7     02-16  Mg     2.0     02-16    TPro  5.8<L>  /  Alb  2.2<L>  /  TBili  0.7  /  DBili  x   /  AST  46<H>  /  ALT  60  /  AlkPhos  73  02-16    Lactate 4.5           02-16 @ 05:40    Lactate 2.5           02-16 @ 02:13    Lactate 3.4           02-16 @ 00:04          PT/INR - ( 16 Feb 2020 00:04 )   PT: 12.3 sec;   INR: 1.10 ratio         PTT - ( 16 Feb 2020 00:04 )  PTT:27.5 sec                Radiology:   < from: Xray Chest 1 View- PORTABLE-Urgent (02.16.20 @ 09:53) >    EXAM:  XR CHEST PORTABLE URGENT 1V                            PROCEDURE DATE:  02/16/2020          INTERPRETATION:  History: Feeding tube placement    2 images were performed of the chest and upper abdomen are correlated with the chest x-ray of earlier in the day.    ET tube and right IJ line are again noted. Feeding tube is seen with the tip at the gastroesophageal junction. Bibasilar infiltrates are again seen in the chest.    Impression: Feeding tube is superiorly positioned and may be advanced.    < end of copied text >    < from: Xray Chest 1 View-PORTABLE IMMEDIATE (02.16.20 @ 06:39) >  INTERPRETATION:  Exam Date: 2/16/2020 6:39 AM    History: Line placement    Technique: 2 frontal portable views of the chest with comparison to  2/16/2020    Findings:    Endotracheal tube in place. Right internal jugular line in the SVC. Heart is normal in size. Multifocal airspace disease in the mid to lower bilateral lungs with small bilateral pleural effusions. Chronic rib fractures. Degenerative changes of the visualized osseous structures.        Impression:    Lines in place    Multifocal airspace disease with small bilateral pleural effusions unchanged    < end of copied text >    < from: Xray Chest 1 View- PORTABLE-Urgent (02.16.20 @ 00:30) >    INTERPRETATION:  History: Cough and shortness of breath    Portable upright chest radiographs are performed. There is no study for comparison    There are extensive bibasilar infiltrates left greater than right. The heart is not enlarged. The trachea is midline. Old right posterior approximately fourth rib fracture is seen with acute appearing right posterior sixth rib fracture. There is no evidence of pneumothorax.    Impression: Bibasilar infiltrates  Acute-appearing posterior right mid rib fractures suspected    < end of copied text >      Bedside ultrasound: B line predominant, unable to visualize costophrenic angles, poor echo windows, no pericardial effusion, IVC 1.7cm without variation    CENTRAL LINE: RIJ TLC  PHILLIP: Y  A-LINE: N    GLOBAL ISSUE/BEST PRACTICE:  Analgesia: Y  Sedation:  Y  CAM-ICU:  NOEL  HOB elevation: yes  Stress ulcer prophylaxis: Y  VTE prophylaxis: Y  Glycemic control: Y  Nutrition: N    CODE STATUS: FULL

## 2020-02-16 NOTE — CONSULT NOTE ADULT - SUBJECTIVE AND OBJECTIVE BOX
HPI:  Patient  is a 75 yo Male with med history  of HLD, HTN, COPD( pt has  home oxygen but as per wife is usually on room air), TERESA, GERD ,constipation , bladder CA, lung CA/ esophogeal cancer with mets to the brain, s/p wedge resection of lung s/p radiation ,not on chemotherapy, h/o laminectomy brought to ED by his wife due to  difficulty breathing with cough and congestion ,that  started the day prior to coming to ER and  continued to worsen and they started him on his home oxygen and began nebulizer treatments. Patient became lethargic and more  disoriented , they increased his oxygen and called EMS.  On EMS arrival pt SPO2 was noted to be in the low 80s while on 10 liters NC.  Pt was placed on CPAP and pt was taken to the ED for further work up Pt is a found to have Flu A and pneumonia evolving into hypoxemic respiratory failure with ARDS, shock intubated for worsening hypoxemia Admitted for septic workup and evaluation ,send blood and urine cx,serial lactate levels,monitor vitals closley,ivfs hydration,monitor urine output and renal profile,iv abx initiated ,ID CONS called   Admitted to INTENSIVE CARE UNIT (16 Feb 2020 07:51)      PAST MEDICAL & SURGICAL HISTORY:  Brain metastases  Constipation  Chronic GERD  HLD (hyperlipidemia)  TERESA (obstructive sleep apnea)  Chronic obstructive pulmonary disease, unspecified COPD type  Lung cancer  Bladder cancer  Hypertension  Chronic obstructive pulmonary disease  Cancer: esophagus mets to brain  H/O laminectomy  H/O hemorrhoidectomy  S/P lobectomy of lung      Antimicrobials  oseltamivir 30 milliGRAM(s) Oral two times a day  piperacillin/tazobactam IVPB.. 3.375 Gram(s) IV Intermittent every 8 hours  vancomycin  IVPB 1000 milliGRAM(s) IV Intermittent every 24 hours      Immunological      Other  ALBUTerol    90 MICROgram(s) HFA Inhaler 4 Puff(s) Inhalation every 6 hours  artificial  tears Solution 1 Drop(s) Both EYES every 2 hours PRN  chlorhexidine 4% Liquid 1 Application(s) Topical <User Schedule>  cisatracurium Infusion 3 MICROgram(s)/kG/Min IV Continuous <Continuous>  enoxaparin Injectable 40 milliGRAM(s) SubCutaneous daily  fentaNYL   Infusion. 0.5 MICROgram(s)/kG/Hr IV Continuous <Continuous>  hydrocortisone sodium succinate Injectable 100 milliGRAM(s) IV Push every 8 hours  ipratropium 17 MICROgram(s) HFA Inhaler 4 Puff(s) Inhalation every 6 hours  norepinephrine Infusion 0.05 MICROgram(s)/kG/Min IV Continuous <Continuous>  pantoprazole  Injectable 40 milliGRAM(s) IV Push daily  propofol Infusion 40 MICROgram(s)/kG/Min IV Continuous <Continuous>  sodium chloride 0.9% lock flush 10 milliLiter(s) IV Push every 1 hour PRN      Allergies    No Known Allergies    Intolerances      SOCIAL HISTORY:  no current toxic habits reported      FAMILY HISTORY: noncontrib      ROS:  limited by cognitive status    Vital Signs Last 24 Hrs  T(C): 37.6 (16 Feb 2020 08:00), Max: 37.6 (16 Feb 2020 08:00)  T(F): 99.6 (16 Feb 2020 08:00), Max: 99.6 (16 Feb 2020 08:00)  HR: 110 (16 Feb 2020 09:11) (85 - 124)  BP: 90/68 (16 Feb 2020 09:00) (72/47 - 147/93)  BP(mean): 74 (16 Feb 2020 09:00) (56 - 114)  RR: 24 (16 Feb 2020 09:00) (16 - 36)  SpO2: 91% (16 Feb 2020 09:11) (84% - 97%)    PE:  WDWN in no distress  HEENT:  NC, pt intubated  Neck:  Supple, no adenopathy  Lungs:  No accessory muscle use, bilaterally clear to auscultation but decreased  Cor:  rapid  Abd:  Symmetric, normoactive BS.  Soft, nontender, no masses, guarding or rebound.  Liver and spleen not enlarged  Extrem:  No cyanosis or edema  Skin:  No rashes.  Musc: not able to cooperate with exam    LABS:                        14.9   2.41  )-----------( 106      ( 16 Feb 2020 05:40 )             43.8     Auto Neutrophil #: 2.15 K/uL (02.16.20 @ 05:40)      WBC Count: 2.41 K/uL (02-16-20 @ 05:40)  WBC Count: 4.20 K/uL (02-15-20 @ 23:39)      02-16    140  |  106  |  35<H>  ----------------------------<  71  3.6   |  22  |  1.40<H>    Ca    7.3<L>      16 Feb 2020 05:40  Phos  1.7     02-16  Mg     2.0     02-16    TPro  5.8<L>  /  Alb  2.2<L>  /  TBili  0.7  /  DBili  x   /  AST  46<H>  /  ALT  60  /  AlkPhos  73  02-16      Creatinine, Serum: 1.40 mg/dL (02-16-20 @ 05:40)  Creatinine, Serum: 1.30 mg/dL (02-16-20 @ 00:04)      MICROBIOLOGY:      RADIOLOGY & ADDITIONAL STUDIES:    --

## 2020-02-16 NOTE — H&P ADULT - HISTORY OF PRESENT ILLNESS
Patient  is a 75 yo Male with med history   of HLD, HTN, COPD( pt has  home oxygen but as per wife is usually on room air), TERESA, GERD ,constipation , bladder CA, lung CA/ esophogeal cancer with mets to the brain, s/p wedge resection of lung s/p radiation ,not on chemotherapy, h/o laminectomy brought to ED by his wife due to  difficulty breathing with cough and congestion ,that  started  yesterday and  continued to worsen and they started him on his home oxygen and began nebulizer treatments. Patient became lethargic and more  disoriented , they increased his oxygen and called EMS.  On EMS arrival pt SPO2 was noted to be in the low 80s while on 10 liters NC.  Pt was placed on CPAP and pt was taken to the ED for further work up Pt is a found to have Flu A and pneumonia evolving into hypoxemic respiratory failure with ARDS, shock intubated this am for worsening hypoxemia Admitted for septic workup and evaluation ,send blood and urine cx,serial lactate levels,monitor vitals closley,ivfs hydration,monitor urine output and renal profile,iv abx initiated ,ID CONS called   Admitted to INTENSIVE CARE UNIT

## 2020-02-16 NOTE — H&P ADULT - NSHPLABSRESULTS_GEN_ALL_CORE
< from: Xray Chest 1 View- PORTABLE-Urgent (02.16.20 @ 09:53) >    EXAM:  XR CHEST PORTABLE URGENT 1V                            PROCEDURE DATE:  02/16/2020          INTERPRETATION:  History: Feeding tube placement    2 images were performed of the chest and upper abdomen are correlated with the chest x-ray of earlier in the day.    ET tube and right IJ line are again noted. Feeding tube is seen with the tip at the gastroesophageal junction. Bibasilar infiltrates are again seen in the chest.    Impression: Feeding tube is superiorly positioned and may be advanced.      < end of copied text >    < from: Xray Chest 1 View-PORTABLE IMMEDIATE (02.16.20 @ 06:39) >    EXAM:  XR CHEST PORTABLE IMMED 1V                            PROCEDURE DATE:  02/16/2020          INTERPRETATION:  Exam Date: 2/16/2020 6:39 AM    History: Line placement    Technique: 2 frontal portable views of the chest with comparison to  2/16/2020    Findings:    Endotracheal tube in place. Right internal jugular line in the SVC. Heart is normal in size. Multifocal airspace disease in the mid to lower bilateral lungs with small bilateral pleural effusions. Chronic rib fractures. Degenerative changes of the visualized osseous structures.        Impression:    Lines in place    Multifocal airspace disease with small bilateral pleural effusions unchanged    < end of copied text >

## 2020-02-16 NOTE — DISCHARGE NOTE FOR THE EXPIRED PATIENT - SECONDARY DIAGNOSIS.
Pneumonia TERESA (obstructive sleep apnea) Chronic obstructive pulmonary disease Esophageal cancer Lung cancer S/P lobectomy of lung Sepsis Brain metastases Influenza

## 2020-02-16 NOTE — AIRWAY PLACEMENT NOTE ADULT - POST AIRWAY PLACEMENT ASSESSMENT:
breath sounds bilateral/breath sounds equal/positive end tidal CO2 noted/CXR completed (ETT adjusted to 26cm from 24cm)

## 2020-02-16 NOTE — DIETITIAN INITIAL EVALUATION ADULT. - ADD RECOMMEND
1) See above for TF recommendation, 2) Recommend to monitor pt for possible refeeding syndrome, recommend to monitor electrolytes daily and replete prn, 3) Recommend MVI daily, 4) Monitor pt's tolerance to TF, weight, skin, edema, GI distress

## 2020-02-16 NOTE — H&P ADULT - MUSCULOSKELETAL
not applicable detailed exam ROM intact/no joint warmth/no calf tenderness/no joint swelling/no joint erythema

## 2020-02-16 NOTE — PROVIDER CONTACT NOTE (EICU) - BACKGROUND
colt for consult:  74 year old male with PMH esophageal cancer, HLD, HTN, COPD, TERESA, bladder CA, lung CA with mets to the brain, s/p wedge resection of lung, and s/p laminectomy  called to evaluate pt for ICU admission by Dr Cook secondary to acute hypoxemic respiratory failure (sat is 87-91% on 100% HFNC), flu A+, multifocal PNA, hypotension, severe sepsis.   Pt reports he has been SOB and nasal congestion x 2 days, accompanied by cough (minimally productive), weakness, back and headache. Denies CP, N/V, fever/chills, myalgias, abd pain.    Seen on camera in ICU on vapotherm 40 L 100 percent

## 2020-02-16 NOTE — H&P ADULT - PROBLEM SELECTOR PLAN 1
WITH ARDS 2/2 TO MULTIFOCAL PNEUMONIA AND VIRAL SYNDROME , SERIAL ABGS , CHEST XRAYS , MANAGEMENT OF VENTILATOR AS PER ICU TEAM

## 2020-02-16 NOTE — PROGRESS NOTE ADULT - ATTENDING COMMENTS
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  He presented with dyspnea and progressed to acute hypoxemic respiratory failure with ARDS, septic shock, WIL, lactic acidosis.      --continue propofol for sedation  add fentanyl gtt (on chronic narcotics)  cisatrocurium gtt for vent synchrony, titrate to TOF 2-3/4  --likely septic shock, continue levophed  fluid bolus overnight seemed to help transiently but will hold on more fluid given FiO2 100  check echo  --acute hypoxemic respiratory failure from ARDS and influenza  LTVV, current TV at goal with acceptable Pplat  tried higher PEEP but did not respond well  serial ABGs  not a candidate for additional ARDS therapies given his underlying advanced malignancy  --WIL in setting of shock  check urine lytes and renal US  --NGT placed for TF  --influenza A and possible superimposed bacterial pneumonia in an immunocompromised host  continue Tamiflu, vanc, zosyn  f/u panCx including MRSA swab  hold home bactrim and fluconazole  --stress dose steroids for adrenal insufficiency given baseline dexamethasone use  --check fs  --plan discussed in detail with family.  It is possible though unlikely that he will survive this acute event and if he does it will be a prolonged recovery process.  His family feels he has been suffering for a long time, and they are considering removing him from life support.         Addendum:  pt developed worsening shock this afternoon.  Family would like to pursue comfort care only.  Pt extubated and life support discontinued.  Fentanyl gtt and propofol gtt continued.  Family at bedside and support given.      Pt critically ill.  CC time 90.

## 2020-02-16 NOTE — DIETITIAN INITIAL EVALUATION ADULT. - OTHER INFO
As per chart pt is a 74 year old male with a PMH of of HLD, HTN, COPD, esophageal cancer, lung cancer with brain mets, on chronic dexamethasone presenting with dyspnea, cough, and congestion.  Found to have FluA evolving into hypoxemic respiratory failure with ARDS, shock   S/P OGT placement 2/16.    Pt is currently intubated and sedated, no family members present at bedside, unable to obtain subjective information at this time. Per chart NKFA.    Pt is currently NPO with recent OGT placement and request from MD for TF recommendation. Pt's admission weight 125lbs, current bedscale weight per RN is 136lbs. Unable to obtain pt's weight hx at this time. Pt appears thin however unable to conduct Nutrition Focused Physical Exam given pt's critical status and with obstruction from breathing tubes. No enough criteria to dx malnutrition at this time. Will continue to monitor closely for any weight changes, recommend to trend pt's daily body weight to obtain a more accurate current weight. No GI distress noted at this time, no BM since admission.     Stage 1 right hip, b/l heels, sacrum pressure injuries  No edema noted at this time

## 2020-02-16 NOTE — PROVIDER CONTACT NOTE (EICU) - RECOMMENDATIONS
Add Vanco, C/W Zosyn, C/W tamiflu  -Is on steroids at home, so stress dose hydrocortisone  - repeat ABG\  - Respiratory status is borderline on high flow, repeat ABG, he may require intubation.  D/W BELEN Damico

## 2020-02-16 NOTE — CONSULT NOTE ADULT - ASSESSMENT
75 yo Male with med history  of HLD, HTN, COPD( pt has  home oxygen but as per wife is usually on room air), TERESA, GERD ,constipation , bladder CA, lung CA/ esophogeal cancer with mets to the brain, s/p wedge resection of lung s/p radiation ,not on chemotherapy, h/o laminectomy brought to ED and adm with hypoxemia, FluA+ and pneumonia

## 2020-02-16 NOTE — CONSULT NOTE ADULT - PROBLEM SELECTOR RECOMMENDATION 4
per ICU    Thank you for consulting us and involving us in the management of this most interesting and challenging case.     We will follow along in the care of this patient.

## 2020-02-17 LAB
CULTURE RESULTS: NO GROWTH — SIGNIFICANT CHANGE UP
SPECIMEN SOURCE: SIGNIFICANT CHANGE UP

## 2020-02-18 LAB
-  AMIKACIN: SIGNIFICANT CHANGE UP
-  AMOXICILLIN/CLAVULANIC ACID: SIGNIFICANT CHANGE UP
-  AMPICILLIN/SULBACTAM: SIGNIFICANT CHANGE UP
-  AMPICILLIN: SIGNIFICANT CHANGE UP
-  AZTREONAM: SIGNIFICANT CHANGE UP
-  CEFAZOLIN: SIGNIFICANT CHANGE UP
-  CEFEPIME: SIGNIFICANT CHANGE UP
-  CEFOXITIN: SIGNIFICANT CHANGE UP
-  CEFTRIAXONE: SIGNIFICANT CHANGE UP
-  CIPROFLOXACIN: SIGNIFICANT CHANGE UP
-  ERTAPENEM: SIGNIFICANT CHANGE UP
-  GENTAMICIN: SIGNIFICANT CHANGE UP
-  IMIPENEM: SIGNIFICANT CHANGE UP
-  LEVOFLOXACIN: SIGNIFICANT CHANGE UP
-  MEROPENEM: SIGNIFICANT CHANGE UP
-  PIPERACILLIN/TAZOBACTAM: SIGNIFICANT CHANGE UP
-  TOBRAMYCIN: SIGNIFICANT CHANGE UP
-  TRIMETHOPRIM/SULFAMETHOXAZOLE: SIGNIFICANT CHANGE UP
CULTURE RESULTS: SIGNIFICANT CHANGE UP
METHOD TYPE: SIGNIFICANT CHANGE UP
ORGANISM # SPEC MICROSCOPIC CNT: SIGNIFICANT CHANGE UP
ORGANISM # SPEC MICROSCOPIC CNT: SIGNIFICANT CHANGE UP
SPECIMEN SOURCE: SIGNIFICANT CHANGE UP

## 2020-02-21 LAB
CULTURE RESULTS: SIGNIFICANT CHANGE UP
CULTURE RESULTS: SIGNIFICANT CHANGE UP
SPECIMEN SOURCE: SIGNIFICANT CHANGE UP
SPECIMEN SOURCE: SIGNIFICANT CHANGE UP

## 2020-03-31 ENCOUNTER — APPOINTMENT (OUTPATIENT)
Dept: PULMONOLOGY | Facility: CLINIC | Age: 75
End: 2020-03-31

## 2020-03-31 ENCOUNTER — RX RENEWAL (OUTPATIENT)
Age: 75
End: 2020-03-31

## 2021-02-10 NOTE — DISCHARGE NOTE FOR THE EXPIRED PATIENT - AUTOPSY OFFERED
February 10, 2021     Killian ALBRECHT DO  [address]  [facility] [address]    Patient: Melanie Zelaya  YOB: 1946  Date of Visit: 2/10/2021      Dear Dr. Cotter:    Thank you for referring Melanie Zelaya to me for evaluation. Below are my notes for this consultation.    If you have questions, please do not hesitate to call me. I look forward to following your patient along with you.         Sincerely,        Blaise Guallpa MD        CC: SANTOS Porter MD Walker, Michael J, MD  2/10/2021  1:26 PM  Sign when Signing Visit  Patient ID: Melanie Zelaya                              : 1946  MRN: [id number]                                            Visit Date: 2/10/2021  Encounter Provider: Blaise Guallpa  Referring Provider: Killian Cotter DO    Subjective      Patient ID: Melanie Zelaya is a 74 y.o. male.  Chief Complaint: follow up      Melanie Zelaya is a 74 y.o. 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  There was indeterminate lymphovascular invasion and no lymph node involvement.  At the present time he has no cough, hemoptysis, significant weight loss over the last 3 months, new persistent bony or neurological complaints, fever, chills, sweats.  6 to 8 weeks ago he had Covid and his symptoms were mostly headache and nausea.    Past Medical History:  has a past medical history of Anxiety, Atrial fibrillation (CMS/HCC), Depression, Fracture of pelvis (CMS/HCC) (), GERD (gastroesophageal reflux disease), Hearing loss, History of colon polyps, Hyperlipidemia, Hypertension, Left atrial enlargement, Lung cancer (CMS/HCC), Lung cancer (CMS/HCC), Pneumonia (), and Seasonal allergies.  Past Surgical History:  has a past surgical history that includes Lung lobectomy (Left,  2016); Shoulder surgery (1998); Rotator cuff repair (Right, 2001); Tonsillectomy; Nasal polyp surgery; and Hand surgery (Bilateral).  Social History:  reports that he quit smoking about 10 years ago. His smoking use included cigarettes. He smoked 2.00 packs per day. He has never used smokeless tobacco. He reports that he does not drink alcohol or use drugs.  Family History: family history includes Cancer in his nephew; Cirrhosis in his biological father; Colon cancer in his biological brother; Diabetes in his biological mother; Lung cancer in his biological brother; No Known Problems in his maternal grandfather, maternal grandmother, paternal grandfather, and paternal grandmother; Pancreatic cancer in his biological sister; Prostate cancer in his biological brother.  Medications:   Current Outpatient Medications:   •  albuterol HFA (PROAIR HFA) 90 mcg/actuation inhaler, Inhale 2 puffs 2 (two) times a day as needed for wheezing or shortness of breath. (Patient taking differently: Inhale 2 puffs as needed for wheezing or shortness of breath.  ), Disp: 1 Inhaler, Rfl: 3  •  ALPRAZolam (XANAX) 0.5 mg tablet, Take 1 tablet (0.5 mg total) by mouth 2 (two) times a day as needed for anxiety., Disp: 30 tablet, Rfl: 1  •  atorvastatin (LIPITOR) 40 mg tablet, Take 1 tablet (40 mg total) by mouth daily., Disp: 90 tablet, Rfl: 1  •  cimetidine (TAGAMET HB) 200 mg tablet, Take 200 mg by mouth as needed., Disp: , Rfl:   •  dabigatran etexilate (PRADAXA) 150 mg capsu, Take 150 mg by mouth 2 (two) times a day., Disp: , Rfl:   •  DOCOSAHEXANOIC ACID/EPA (FISH OIL ORAL), Take 4 capsules by mouth daily. OTC suppliement , Disp: , Rfl:   •  escitalopram (LEXAPRO) 5 mg tablet, Take 1 tablet (5 mg total) by mouth once daily., Disp: 90 tablet, Rfl: 1  •  fluticasone propionate (FLONASE) 50 mcg/actuation nasal spray, Administer 2 sprays into each nostril daily., Disp: 3 Bottle, Rfl: 1  •  hydrochlorothiazide (HYDRODIURIL) 12.5 mg tablet,  "Take 12.5 mg by mouth daily., Disp: , Rfl:   •  irbesartan (AVAPRO) 300 mg tablet, Take 300 mg by mouth nightly., Disp: , Rfl:   •  loratadine (CLARITIN) 10 mg tablet, Take 1 tablet (10 mg total) by mouth daily. (Patient taking differently: Take 10 mg by mouth daily as needed.  ), Disp: 90 tablet, Rfl: 1  •  metoprolol tartrate (LOPRESSOR) 25 mg tablet, Take 25 mg by mouth 2 (two) times a day., Disp: , Rfl:   •  multivitamin (THERAGRAN) tablet, Take 1 tablet by mouth daily., Disp: , Rfl:   •  umeclidinium-vilanterol (ANORO ELLIPTA) 62.5-25 mcg/actuation blister with device, Inhale 1 puff daily., Disp: , Rfl:   Allergies: is allergic to no known allergies.   E-cigarette/Vaping     Questions Responses    E-cigarette/Vaping Use Never User           The following have been reviewed and updated as appropriate in this visit:     Allergies  Meds  Problems       Review of Systems   All other systems reviewed and are negative.      Objective   Vitals:   Visit Vitals  BP (!) 142/88 (BP Location: Right upper arm, Patient Position: Sitting)   Pulse 61   Temp 36.7 °C (98.1 °F) (Temporal)   Resp 16   Ht 1.676 m (5' 6\")   Wt 87.5 kg (193 lb)   SpO2 98%   BMI 31.15 kg/m²       Physical Exam  Vitals signs reviewed.   Constitutional:       Appearance: He is well-developed.   HENT:      Head: Normocephalic.   Eyes:      Pupils: Pupils are equal, round, and reactive to light.   Cardiovascular:      Rate and Rhythm: Normal rate and regular rhythm.   Pulmonary:      Effort: Pulmonary effort is normal.      Breath sounds: Normal breath sounds.   Musculoskeletal: Normal range of motion.   Skin:     General: Skin is warm and dry.   Neurological:      Mental Status: He is alert.         Assessment/Plan   Independent review of all imaging was done by myself as well as review of the radiologists readings and comparison to prior films.  CAT scan on January 20 shows no evidence of definitive recurrence but there is hazy opacities bilaterally. "  I think we need to perform a short interval CAT scan of the chest without contrast so we will see him in 3 months with that.  This may well be due to his prior Covid infection.       Problem List Items Addressed This Visit        Respiratory    Primary malignant neoplasm of left lower lobe of lung (CMS/HCC) - Primary (Chronic)    Relevant Orders    CT CHEST WITHOUT IV CONTRAST            Blaise Guallpa MD              yes

## 2024-05-09 NOTE — H&P ADULT - LYMPH NODES
ED provider notified of vital signs and reassessment.  Patient remains appropriate for admission to ICU.   
Report provided to MELVIN Rowley  
No lymphadedenopathy

## 2024-07-16 NOTE — PATIENT PROFILE ADULT - LOCATION #2
K[facility] followed up on discharge planning.    KURT spoke directly to Dr. Patino's RN Kannan.    KURT everything is completed all the patient needs is a referral.  [facility] will complete the prior auth.      He will fax referral for [facility] right away.    KURT made it clear that the patient is ready to discharge.    Update 1:20 PM    KURT spoke again to [facility] answering service.  Representative stated the site staff are confused about the referral.  KURT stated it was made very clear what was needed.  New representative stated she will let site staff know ASAP and reply to KURT.    Update 4:08 PM    Still no referral received/faxed to [facility] at time of note.    Case escalated to SW supervisor Jelena and BOOGIE Schultz.  Avoidable day added due to delay.    PLAN: DC to [facility] w/[facility] and outpatient IV abx at [facility] pending referral/prior auth     / to remain available for support and/or discharge planning.     Ruthann Sauceda MSW, LSW [id number]         hip